# Patient Record
Sex: FEMALE | Race: WHITE | Employment: OTHER | URBAN - METROPOLITAN AREA
[De-identification: names, ages, dates, MRNs, and addresses within clinical notes are randomized per-mention and may not be internally consistent; named-entity substitution may affect disease eponyms.]

---

## 2017-01-06 ENCOUNTER — OFFICE VISIT (OUTPATIENT)
Dept: FAMILY MEDICINE | Facility: CLINIC | Age: 82
End: 2017-01-06
Payer: COMMERCIAL

## 2017-01-06 VITALS
TEMPERATURE: 97.1 F | WEIGHT: 131 LBS | BODY MASS INDEX: 24.73 KG/M2 | SYSTOLIC BLOOD PRESSURE: 154 MMHG | DIASTOLIC BLOOD PRESSURE: 62 MMHG | HEIGHT: 61 IN | OXYGEN SATURATION: 98 % | HEART RATE: 82 BPM

## 2017-01-06 DIAGNOSIS — I10 ESSENTIAL HYPERTENSION WITH GOAL BLOOD PRESSURE LESS THAN 140/90: Primary | ICD-10-CM

## 2017-01-06 DIAGNOSIS — F32.0 MAJOR DEPRESSIVE DISORDER, SINGLE EPISODE, MILD (H): ICD-10-CM

## 2017-01-06 LAB
ANION GAP SERPL CALCULATED.3IONS-SCNC: 10 MMOL/L (ref 3–14)
BUN SERPL-MCNC: 17 MG/DL (ref 7–30)
CALCIUM SERPL-MCNC: 9.3 MG/DL (ref 8.5–10.1)
CHLORIDE SERPL-SCNC: 104 MMOL/L (ref 94–109)
CO2 SERPL-SCNC: 27 MMOL/L (ref 20–32)
CREAT SERPL-MCNC: 0.71 MG/DL (ref 0.52–1.04)
CREAT UR-MCNC: 33 MG/DL
GFR SERPL CREATININE-BSD FRML MDRD: 78 ML/MIN/1.7M2
GLUCOSE SERPL-MCNC: 136 MG/DL (ref 70–99)
MICROALBUMIN UR-MCNC: 6 MG/L
MICROALBUMIN/CREAT UR: 19.3 MG/G CR (ref 0–25)
POTASSIUM SERPL-SCNC: 3.6 MMOL/L (ref 3.4–5.3)
SODIUM SERPL-SCNC: 141 MMOL/L (ref 133–144)

## 2017-01-06 PROCEDURE — 99214 OFFICE O/P EST MOD 30 MIN: CPT | Performed by: FAMILY MEDICINE

## 2017-01-06 PROCEDURE — 36415 COLL VENOUS BLD VENIPUNCTURE: CPT | Performed by: FAMILY MEDICINE

## 2017-01-06 PROCEDURE — 82043 UR ALBUMIN QUANTITATIVE: CPT | Performed by: FAMILY MEDICINE

## 2017-01-06 PROCEDURE — 80048 BASIC METABOLIC PNL TOTAL CA: CPT | Performed by: FAMILY MEDICINE

## 2017-01-06 RX ORDER — DILTIAZEM HYDROCHLORIDE 240 MG/1
240 CAPSULE, COATED, EXTENDED RELEASE ORAL EVERY EVENING
Qty: 90 CAPSULE | Refills: 1 | Status: SHIPPED | OUTPATIENT
Start: 2017-01-06 | End: 2017-07-14

## 2017-01-06 RX ORDER — CANDESARTAN 32 MG/1
1 TABLET ORAL DAILY
Qty: 90 TABLET | Refills: 1 | Status: SHIPPED | OUTPATIENT
Start: 2017-01-06 | End: 2017-07-14

## 2017-01-06 RX ORDER — VALSARTAN 320 MG/1
320 TABLET ORAL DAILY
Qty: 90 TABLET | Refills: 1 | Status: CANCELLED | OUTPATIENT
Start: 2017-01-06

## 2017-01-06 ASSESSMENT — PAIN SCALES - GENERAL: PAINLEVEL: NO PAIN (0)

## 2017-01-06 NOTE — MR AVS SNAPSHOT
After Visit Summary   1/6/2017    Leela uDdley    MRN: [id number]           Patient Information     Date Of Birth          9/14/1926        Visit Information        Provider Department      1/6/2017 10:40 AM Ronnie Arora MD [facility]        Today's Diagnoses     Essential hypertension with goal blood pressure less than 140/90    -  1     Major depressive disorder, single episode, mild (H)         Need for prophylactic vaccination and inoculation against influenza            Follow-ups after your visit        Follow-up notes from your care team     Return in about 6 months (around 6/26/2017) for blood pressure check.      Who to contact     If you have questions or need follow up information about today's clinic visit or your schedule please contact [facility] directly at [phone].  Normal or non-critical lab and imaging results will be communicated to you by MyChart, letter or phone within 4 business days after the clinic has received the results. If you do not hear from us within 7 days, please contact the clinic through MyChart or phone. If you have a critical or abnormal lab result, we will notify you by phone as soon as possible.  Submit refill requests through [employer] or call your pharmacy and they will forward the refill request to us. Please allow 3 business days for your refill to be completed.          Additional Information About Your Visit        MyChart Information     [employer] gives you secure access to your electronic health record. If you see a primary care provider, you can also send messages to your care team and make appointments. If you have questions, please call your primary care clinic.  If you do not have a primary care provider, please call [phone] and they will assist you.        Care EveryWhere ID     This is your Care EveryWhere ID. This could be used by other organizations to access your [facility]  "records  [id number]        Your Vitals Were     Pulse Temperature Height BMI (Body Mass Index) Pulse Oximetry Breastfeeding?    82 97.1  F (36.2  C) (Oral) 5' 1\" (1.549 m) 24.76 kg/m2 98% No       Blood Pressure from Last 3 Encounters:   01/06/17 154/62   05/20/16 132/66   12/11/15 136/76    Weight from Last 3 Encounters:   01/06/17 131 lb (59.421 kg)   07/29/16 125 lb (56.7 kg)   05/20/16 125 lb (56.7 kg)              We Performed the Following     Albumin Random Urine Quantitative     BASIC METABOLIC PANEL     DEPRESSION ACTION PLAN (DAP) Order [[id number]]          Today's Medication Changes          These changes are accurate as of: 1/6/17 11:32 AM.  If you have any questions, ask your nurse or doctor.               Start taking these medicines.        Dose/Directions    candesartan cilexetil 32 MG Tabs   Commonly known as:  ATACAND   Used for:  Essential hypertension with goal blood pressure less than 140/90   Started by:  Ronnie Arora MD        Dose:  1 tablet   Take 1 tablet by mouth daily for blood pressure.   Quantity:  90 tablet   Refills:  1         Stop taking these medicines if you haven't already. Please contact your care team if you have questions.     valsartan 320 MG tablet   Commonly known as:  DIOVAN   Stopped by:  Ronnie Arora MD                Where to get your medicines      These medications were sent to [facility]/pharmacy #[id number] - [facility] [place] - [address].  [address], [place] [address]     Phone:  [phone]    - candesartan cilexetil 32 MG Tabs  - diltiazem 240 MG 24 hr capsule             Primary Care Provider Office Phone # Fax #    Ronnie Arora [phone]       [facility] [address]  [facility] [address]        Thank you!     Thank you for choosing [facility]  for your care. Our goal is always to provide you with excellent care. Hearing back from our patients is one way we can continue " "to improve our services. Please take a few minutes to complete the written survey that you may receive in the mail after your visit with us. Thank you!             Your Updated Medication List - Protect others around you: Learn how to safely use, store and throw away your medicines at www.disposemymeds.org.          This list is accurate as of: 1/6/17 11:32 AM.  Always use your most recent med list.                   Brand Name Dispense Instructions for use    aspirin 81 MG tablet      1 TABLET DAILY       CALTRATE 600+D PO      Take 1 tablet by mouth daily.       candesartan cilexetil 32 MG Tabs    ATACAND    90 tablet    Take 1 tablet by mouth daily for blood pressure.       diltiazem 240 MG 24 hr capsule     90 capsule    Take 1 capsule (240 mg) by mouth every evening for blood pressure.       order for DME     1 Device    Equipment being ordered: Wheelchair  Vital signs:  Height 5' 1.5\" (1.562 m), weight 138 lb (62.596 kg)         "

## 2017-01-06 NOTE — Clinical Note
My Depression Action Plan  Name: Leela Dudley   Date of Birth 9/14/1926  Date: 1/6/2017    My doctor: Ronnie Arora   My clinic: [address] [id number] [phone]          GREEN    ZONE   Good Control    What it looks like:     Things are going generally well. You have normal up s and down s. You may even feel depressed from time to time, but bad moods usually last less than a day.   What you need to do:  1. Continue to care for yourself (see self care plan)  2. Check your depression survival kit and update it as needed  3. Follow your physician s recommendations including any medication.  4. Do not stop taking medication unless you consult with your physician first.           YELLOW         ZONE Getting Worse    What it looks like:     Depression is starting to interfere with your life.     It may be hard to get out of bed; you may be starting to isolate yourself from others.    Symptoms of depression are starting to last most all day and this has happened for several days.     You may have suicidal thoughts but they are not constant.   What you need to do:     1. Call your care team, your response to treatment will improve if you keep your care team informed of your progress. Yellow periods are signs an adjustment may need to be made.     2. Continue your self-care, even if you have to fake it!    3. Talk to someone in your support network    4. Open up your depression survival kit           RED    ZONE Medical Alert - Get Help    What it looks like:     Depression is seriously interfering with your life.     You may experience these or other symptoms: You can t get out of bed most days, can t work or engage in other necessary activities, you have trouble taking care of basic hygiene, or basic responsibilities, thoughts of suicide or death that will not go away, self-injurious behavior.     What you need to do:  1. Call your  care team and request a same-day appointment. If they are not available (weekends or after hours) call your local crisis line, emergency room or 911.      Electronically signed by: Annalee Delcid, January 6, 2017    Depression Self Care Plan / Survival Kit    Self-Care for Depression  Here s the deal. Your body and mind are really not as separate as most people think.  What you do and think affects how you feel and how you feel influences what you do and think. This means if you do things that people who feel good do, it will help you feel better.  Sometimes this is all it takes.  There is also a place for medication and therapy depending on how severe your depression is, so be sure to consult with your medical provider and/ or Behavioral Health Consultant if your symptoms are worsening or not improving.     In order to better manage my stress, I will:    Exercise  Get some form of exercise, every day. This will help reduce pain and release endorphins, the  feel good  chemicals in your brain. This is almost as good as taking antidepressants!  This is not the same as joining a gym and then never going! (they count on that by the way ) It can be as simple as just going for a walk or doing some gardening, anything that will get you moving.      Hygiene   Maintain good hygiene (Get out of bed in the morning, Make your bed, Brush your teeth, Take a shower, and Get dressed like you were going to work, even if you are unemployed).  If your clothes don't fit try to get ones that do.    Diet  I will strive to eat foods that are good for me, drink plenty of water, and avoid excessive sugar, caffeine, alcohol, and other mood-altering substances.  Some foods that are helpful in depression are: complex carbohydrates, B vitamins, flaxseed, fish or fish oil, fresh fruits and vegetables.    Psychotherapy  I agree to participate in Individual Therapy (if recommended).    Medication  If prescribed medications, I agree to take them.   Missing doses can result in serious side effects.  I understand that drinking alcohol, or other illicit drug use, may cause potential side effects.  I will not stop my medication abruptly without first discussing it with my provider.    Staying Connected With Others  I will stay in touch with my friends, family members, and my primary care provider/team.    Use your imagination  Be creative.  We all have a creative side; it doesn t matter if it s oil painting, sand castles, or mud pies! This will also kick up the endorphins.    Witness Beauty  (AKA stop and smell the roses) Take a look outside, even in mid-winter. Notice colors, textures. Watch the squirrels and birds.     Service to others  Be of service to others.  There is always someone else in need.  By helping others we can  get out of ourselves  and remember the really important things.  This also provides opportunities for practicing all the other parts of the program.    Humor  Laugh and be silly!  Adjust your TV habits for less news and crime-drama and more comedy.    Control your stress  Try breathing deep, massage therapy, biofeedback, and meditation. Find time to relax each day.     My support system    Clinic Contact:  Phone number:    Contact 1:  Phone number:    Contact 2:  Phone number:    Oriental orthodox/:  Phone number:    Therapist:  Phone number:    Local crisis center:    Phone number:    Other community support:  Phone number:

## 2017-01-06 NOTE — NURSING NOTE
"Chief Complaint   Patient presents with     Recheck Medication       Initial /81 mmHg  Pulse 82  Temp(Src) 97.1  F (36.2  C) (Oral)  Ht 5' 1\" (1.549 m)  Wt 131 lb (59.421 kg)  BMI 24.76 kg/m2  SpO2 98%  Breastfeeding? No Estimated body mass index is 24.76 kg/(m^2) as calculated from the following:    Height as of this encounter: 5' 1\" (1.549 m).    Weight as of this encounter: 131 lb (59.421 kg).  BP completed using cuff size: girma Leggett MA          "

## 2017-01-06 NOTE — PROGRESS NOTES
SUBJECTIVE:                                                    Leela Dudley is a 89 year old female who presents to clinic today for the following health issues:  She is accompanied by her daughter.     Hypertension Follow-up      Outpatient blood pressures are not being checked.    Low Salt Diet: low salt   -Patient's daughter reports that the valsartan is so big that she has difficulty swallowing them at times.    Depression Followup    Status since last visit: Stable     See PHQ-9 for current symptoms.  Other associated symptoms: None    Complicating factors:   Significant life event:  No   Current substance abuse:  None  Anxiety or Panic symptoms:  No    PHQ-9  English PHQ-9   Any Language      -Patient reports a nurse from [facility] was at her mother's house and told them to make an appointment with the doctor. The in-home nurse comes once a week in the late morning to check on the patient, do house chores, cleaning.       Amount of exercise or physical activity: None    Problems taking medications regularly: No    Medication side effects: none    Diet: low salt    Past medical, family, and social histories, medications, and allergies are reviewed and updated in Epic.     ROS:  C: NEGATIVE for fever, chills, change in weight  E/M: NEGATIVE for ear, mouth and throat problems  R: NEGATIVE for significant cough or SOB  CV: NEGATIVE for chest pain, palpitations or peripheral edema  PSYCHIATRIC: Hx dementia per daughter. Patient's daughter reports being told by the in-home nurse that there are meds left over from Feb 2016. However, patient reports she takes all her meds consistently. Patient's daughter notes the patient has a system she uses to keep track of which pills she has taken (once she takes a pill out of the bottle she flips the bottle over). Patient's daughter reports she has tried to give her a medication week box but she does not use it.   ROS otherwise negative      Any history above obtained by the  "Medical Assistant was reviewed by Dr. Ronnie Arora MD, and edited when necessary.    This document serves as a record of the services and decisions personally performed and made by Dr. Arora. It was created on his behalf by Annalee Delcid, a trained medical scribe. The creation of this document is based on the provider's statements to the medical scribe.  Annalee Delcid January 6, 2017 11:01 AM   OBJECTIVE:                                                    /62 mmHg  Pulse 82  Temp(Src) 97.1  F (36.2  C) (Oral)  Ht 5' 1\" (1.549 m)  Wt 131 lb (59.421 kg)  BMI 24.76 kg/m2  SpO2 98%  Breastfeeding? No  Body mass index is 24.76 kg/(m^2).  GENERAL: healthy, alert and no distress  EYES: Eyes grossly normal to inspection, PERRL and conjunctivae and sclerae normal  MS: no gross musculoskeletal defects noted, no edema  SKIN: no suspicious lesions or rashes  NEURO: Normal strength and tone, mentation intact and speech normal, cranial nerves 2-12 intact   PSYCH: mentation appears normal, affect normal/bright     Six Item Cognitive Impairment Test   (6CIT):      What year is it?                               Incorrect - 4 points (2016)    What month is it?                               Correct - 0 points      Give the patient an address to remember with five components:   Jm Bradley ( first and last name - 2 components)   [address]  (number and name of street - 2 components)   [place] ( city - 1 component)      About what time is it (within the hour)? Correct - 0 points    Count backwards from 20 to 1:   Correct - 0 points    Say the months of the year in reverse: Correct - 0 points    Repeat the address phrase:   4 errors - 8 points    Total 6CIT Score:      12/28    Interpretation: The 6CIT uses an inverse score and questions are weighted to produce a total out of 28. Scores of 0-7 are considered normal and 8 or more significant.    Advantages The test has high sensitivity without compromising " specificity even in mild dementia. It is easy to translate linguistically and culturally.  Disadvantages The main disadvantage is in the scoring and weighting of the test, which is initially confusing, however computer models have simplified this greatly.    Probability Statistics: At the 7/8 cut off: Overall figures sensitivity 90% specificity 100%, in mild dementia sensitivity = 78% , specificity = 100%    Copyright 2000 [facility], [facility]. Courtesy of Dr. Jonny Merchant      ASSESSMENT/PLAN:                                                    (I10) Essential hypertension with goal blood pressure less than 140/90  (primary encounter diagnosis)  Comment: Uncontrolled. Factors include advanced age (consider changing her goal), noncompliance due to cognitive impairment, and apparent difficulty with swallowing the large valsartan pill. I will change the valsartan to another ARB.  Plan: BASIC METABOLIC PANEL, Albumin Random Urine         Quantitative, diltiazem 240 MG 24 hr capsule,         candesartan cilexetil (ATACAND) 32 MG TABS        Follow up in 6 months. Daughter will do the best she can to help with med compliance in the home.     (F32.0) Major depressive disorder, single episode, mild (H)  Comment: The patient shows to discontinue her SSRI about 6 months ago, and there is no desire to take medication for this now.   Plan: DEPRESSION ACTION PLAN (DAP) Order [[id number]]        Follow up in 6 months      The information in this document, created by the medical scribe for me, accurately reflects the services I personally performed and the decisions made by me. I have reviewed and approved this document for accuracy prior to leaving the patient care area.  Ronnie Arora MD

## 2017-01-07 ASSESSMENT — PATIENT HEALTH QUESTIONNAIRE - PHQ9: SUM OF ALL RESPONSES TO PHQ QUESTIONS 1-9: 9

## 2017-05-05 ENCOUNTER — OFFICE VISIT (OUTPATIENT)
Dept: OPHTHALMOLOGY | Facility: CLINIC | Age: 82
End: 2017-05-05
Payer: COMMERCIAL

## 2017-05-05 DIAGNOSIS — Z01.01 ENCOUNTER FOR EXAMINATION OF EYES AND VISION WITH ABNORMAL FINDINGS: Primary | ICD-10-CM

## 2017-05-05 DIAGNOSIS — H52.4 PRESBYOPIA: ICD-10-CM

## 2017-05-05 DIAGNOSIS — D31.31 CHOROIDAL NEVUS, RIGHT: ICD-10-CM

## 2017-05-05 DIAGNOSIS — H35.3190 NONEXUDATIVE SENILE MACULAR DEGENERATION OF RETINA: ICD-10-CM

## 2017-05-05 DIAGNOSIS — Z96.1 PSEUDOPHAKIA: ICD-10-CM

## 2017-05-05 PROCEDURE — 92014 COMPRE OPH EXAM EST PT 1/>: CPT | Performed by: STUDENT IN AN ORGANIZED HEALTH CARE EDUCATION/TRAINING PROGRAM

## 2017-05-05 PROCEDURE — 92015 DETERMINE REFRACTIVE STATE: CPT | Performed by: STUDENT IN AN ORGANIZED HEALTH CARE EDUCATION/TRAINING PROGRAM

## 2017-05-05 ASSESSMENT — REFRACTION_WEARINGRX
OD_CYLINDER: +1.00
OD_AXIS: 043
OD_SPHERE: -1.00
OS_ADD: +3.00
OS_CYLINDER: +1.25
SPECS_TYPE: BIFOCAL
OS_SPHERE: -2.00
OS_AXIS: 101
OD_ADD: +3.00

## 2017-05-05 ASSESSMENT — SLIT LAMP EXAM - LIDS
COMMENTS: NORMAL
COMMENTS: NORMAL

## 2017-05-05 ASSESSMENT — EXTERNAL EXAM - LEFT EYE: OS_EXAM: NORMAL

## 2017-05-05 ASSESSMENT — VISUAL ACUITY
OD_CC: 20/50
OD_PH_CC: 20/30-1
OS_CC+: -2
OS_CC: 20/25
METHOD: SNELLEN - LINEAR
CORRECTION_TYPE: GLASSES

## 2017-05-05 ASSESSMENT — REFRACTION_MANIFEST
OD_AXIS: 020
OD_ADD: +3.25
OS_ADD: +3.25
OD_CYLINDER: +1.75
OD_SPHERE: -1.00
OS_AXIS: 104
OS_CYLINDER: +1.25
OS_SPHERE: -2.00

## 2017-05-05 ASSESSMENT — TONOMETRY
OS_IOP_MMHG: 16
OD_IOP_MMHG: 14
IOP_METHOD: APPLANATION

## 2017-05-05 ASSESSMENT — CONF VISUAL FIELD
OD_NORMAL: 1
OS_NORMAL: 1

## 2017-05-05 ASSESSMENT — EXTERNAL EXAM - RIGHT EYE: OD_EXAM: NORMAL

## 2017-05-05 ASSESSMENT — CUP TO DISC RATIO
OD_RATIO: 0.2
OS_RATIO: 0.2

## 2017-05-05 NOTE — PROGRESS NOTES
Current Eye Medications:  None     Subjective:  Here for complete today. Doing well, no pain or problems with the eyes, last visit was 2014. Does have some white discharge in the corner of the right eye today on exam.       Objective:  See Ophthalmology Exam.       Assessment:  Leela Dudley is a 89 year old female who presents with:   Encounter Diagnoses   Name Primary?      Pseudophakia OU, with Yag OS      Choroidal nevus, right Stable     Nonexudative senile macular degeneration of retina Stable       Plan:  Glasses prescription given  Use a multiple vitamin as discussed on a daily basis or AREDS2 formula vitamins  Monitor the vision in each eye weekly - Call if any sudden persistent changes.  Wear sunglasses with UV protection in maura conditions.  Eat lots of dark leafy green vegetables.    Hi Grande MD  [phone]

## 2017-05-05 NOTE — PATIENT INSTRUCTIONS
Glasses prescription given  Use a multiple vitamin as discussed on a daily basis or AREDS2 formula vitamins  Monitor the vision in each eye weekly - Call if any sudden persistent changes.  Wear sunglasses with UV protection in maura conditions.  Eat lots of dark leafy green vegetables.    Hi Grande MD  [phone]

## 2017-05-15 ENCOUNTER — TELEPHONE (OUTPATIENT)
Dept: FAMILY MEDICINE | Facility: CLINIC | Age: 82
End: 2017-05-15

## 2017-05-15 NOTE — TELEPHONE ENCOUNTER
----- Message from Ronnie Arora MD sent at 1/6/2017 11:34 AM CST -----  Regarding: HTN  Daughter would like scheduling reminder for pt (due HTN visit last week of June)sent by Outline App.

## 2017-07-14 ENCOUNTER — OFFICE VISIT (OUTPATIENT)
Dept: FAMILY MEDICINE | Facility: CLINIC | Age: 82
End: 2017-07-14
Payer: COMMERCIAL

## 2017-07-14 VITALS
SYSTOLIC BLOOD PRESSURE: 178 MMHG | OXYGEN SATURATION: 97 % | HEART RATE: 96 BPM | HEIGHT: 61 IN | TEMPERATURE: 99 F | WEIGHT: 132 LBS | DIASTOLIC BLOOD PRESSURE: 88 MMHG | BODY MASS INDEX: 24.92 KG/M2

## 2017-07-14 DIAGNOSIS — Z86.39 HISTORY OF ELEVATED GLUCOSE: ICD-10-CM

## 2017-07-14 DIAGNOSIS — F32.0 MAJOR DEPRESSIVE DISORDER, SINGLE EPISODE, MILD (H): Primary | ICD-10-CM

## 2017-07-14 DIAGNOSIS — E78.5 HYPERLIPIDEMIA LDL GOAL <130: ICD-10-CM

## 2017-07-14 DIAGNOSIS — I10 ESSENTIAL HYPERTENSION WITH GOAL BLOOD PRESSURE LESS THAN 140/90: ICD-10-CM

## 2017-07-14 LAB
CHOLEST SERPL-MCNC: 237 MG/DL
CREAT SERPL-MCNC: 0.68 MG/DL (ref 0.52–1.04)
GFR SERPL CREATININE-BSD FRML MDRD: 81 ML/MIN/1.7M2
GLUCOSE SERPL-MCNC: 107 MG/DL (ref 70–99)
HBA1C MFR BLD: 6.5 % (ref 4.3–6)
HDLC SERPL-MCNC: 51 MG/DL
LDLC SERPL CALC-MCNC: 158 MG/DL
NONHDLC SERPL-MCNC: 186 MG/DL
POTASSIUM SERPL-SCNC: 4 MMOL/L (ref 3.4–5.3)
TRIGL SERPL-MCNC: 139 MG/DL

## 2017-07-14 PROCEDURE — 80061 LIPID PANEL: CPT | Performed by: FAMILY MEDICINE

## 2017-07-14 PROCEDURE — 82947 ASSAY GLUCOSE BLOOD QUANT: CPT | Performed by: FAMILY MEDICINE

## 2017-07-14 PROCEDURE — 84132 ASSAY OF SERUM POTASSIUM: CPT | Performed by: FAMILY MEDICINE

## 2017-07-14 PROCEDURE — 99214 OFFICE O/P EST MOD 30 MIN: CPT | Performed by: FAMILY MEDICINE

## 2017-07-14 PROCEDURE — 83036 HEMOGLOBIN GLYCOSYLATED A1C: CPT | Performed by: FAMILY MEDICINE

## 2017-07-14 PROCEDURE — 82565 ASSAY OF CREATININE: CPT | Performed by: FAMILY MEDICINE

## 2017-07-14 PROCEDURE — 36415 COLL VENOUS BLD VENIPUNCTURE: CPT | Performed by: FAMILY MEDICINE

## 2017-07-14 RX ORDER — DILTIAZEM HYDROCHLORIDE 360 MG/1
360 CAPSULE, EXTENDED RELEASE ORAL EVERY EVENING
Qty: 90 CAPSULE | Refills: 0 | Status: SHIPPED | OUTPATIENT
Start: 2017-07-14 | End: 2018-01-09

## 2017-07-14 RX ORDER — CANDESARTAN 32 MG/1
1 TABLET ORAL DAILY
Qty: 90 TABLET | Refills: 0 | Status: SHIPPED | OUTPATIENT
Start: 2017-07-14 | End: 2017-11-05

## 2017-07-14 ASSESSMENT — PAIN SCALES - GENERAL: PAINLEVEL: NO PAIN (0)

## 2017-07-14 NOTE — NURSING NOTE
"Chief Complaint   Patient presents with     Hypertension       Initial /89 (BP Location: Left arm, Patient Position: Chair, Cuff Size: Adult Regular)  Pulse 96  Temp 99  F (37.2  C) (Oral)  Ht 5' 1\" (1.549 m)  Wt 132 lb (59.9 kg)  SpO2 97%  BMI 24.94 kg/m2 Estimated body mass index is 24.94 kg/(m^2) as calculated from the following:    Height as of this encounter: 5' 1\" (1.549 m).    Weight as of this encounter: 132 lb (59.9 kg).  Medication Reconciliation: complete     EN Mayes MA      "

## 2017-07-14 NOTE — PROGRESS NOTES
"  SUBJECTIVE:                                                    Leela Dudley is a 89 year old female who presents to clinic today for the following health issues:  She is accompanied by her daughter.     Hypertension Follow-up      Outpatient blood pressures are not being checked.    Low Salt Diet: not monitoring salt      Amount of exercise or physical activity: None    Problems taking medications regularly: No    Medication side effects: none    Diet: regular (no restrictions)    Patient denies hx of CAD, DM, MI.    Past medical, family, and social histories, medications, and allergies are reviewed and updated in Epic.     ROS:  C: NEGATIVE for fever, chills, change in weight  E/M: NEGATIVE for ear, mouth and throat problems  R: NEGATIVE for significant cough or SOB  CV: NEGATIVE for chest pain, palpitations or peripheral edema  PSYCH: PHQ-9 = 1  ROS otherwise negative    Any history above obtained by the Medical Assistant was reviewed by Dr. Ronnie Arora MD, and edited when necessary.    This document serves as a record of the services and decisions personally performed and made by Dr. Arora. It was created on his behalf by Melvi Jordan, a trained medical scribe. The creation of this document is based the provider's statements to the medical scribe.  Melvi Jordan July 14, 2017 12:15 PM     OBJECTIVE:                                                    /88  Pulse 96  Temp 99  F (37.2  C) (Oral)  Ht 1.549 m (5' 1\")  Wt 59.9 kg (132 lb)  SpO2 97%  BMI 24.94 kg/m2   Body mass index is 24.94 kg/(m^2).     EXAM:  GENERAL: healthy, alert and no distress  EYES: Eyes grossly normal to inspection, PERRL, EOMI, sclerae white and conjunctivae normal  MS: no gross musculoskeletal defects noted, no edema  SKIN: no suspicious lesions or rashes  NEURO: Normal strength and tone, sensory exam grossly normal, mentation intact, oriented times 3 and cranial nerves 2-12 intact  PSYCH: mentation appears " normal, affect normal/bright     Diagnostic Test Results:  none      ASSESSMENT/PLAN:                                                      (F32.0) Major depressive disorder, single episode, mild (H)  (primary encounter diagnosis)  Comment: Stable  Plan: Monitor periodically    (I10) Essential hypertension with goal blood pressure less than 140/90  Comment: Uncontrolled on max dose of ARB. I will increase her CCB.  Plan: diltiazem (CARDIZEM CD; CARTIA XT) 360 MG 24 hr        CD capsule, candesartan cilexetil (ATACAND) 32         MG TABS, Potassium, Creatinine        Follow up in 3 weeks.     (E78.5) Hyperlipidemia LDL goal <130  Comment: Patient denies a history of CAD, heart or vascular surgery, etc.  Plan: Lipid panel reflex to direct LDL            (Z86.39) History of elevated glucose  Comment: Patient denies a history of diabetes, and she is not treated for it currently. She has had mild glucose elevations lately however.  Plan: Glucose, Hemoglobin A1c        Follow up as needed.     The information in this document, created by the medical scribe for me, accurately reflects the services I personally performed and the decisions made by me. I have reviewed and approved this document for accuracy prior to leaving the patient care area. July 14, 2017 12:15 PM     Ronnie Arora MD

## 2017-07-14 NOTE — PATIENT INSTRUCTIONS
How to contact your care team: [phone] Pharmacy [phone]   TEA BLANDON MD KATYA GEORGIEV, PA-C CHRIS JONES, PA-C NAM HO, MD JONATHAN BATES, MD ARVIN VOCAL, MD    Clinic hours M-Th 7am-7pm Fri 7am-5pm.   Urgent care M-F 11am-9pm  Sat/Sun 9am-5pm.   Pharmacy   Mon-Th:  8:00am-8pm   Fri:  8:00am-6:00pm  Sat/Sun  8:00am-5:00 pm

## 2017-07-14 NOTE — MR AVS SNAPSHOT
After Visit Summary   7/14/2017    Leela Dudley    MRN: [id number]           Patient Information     Date Of Birth          9/14/1926        Visit Information        Provider Department      7/14/2017 11:40 AM Ronnie Arora MD [facility]        Today's Diagnoses     Major depressive disorder, single episode, mild (H)    -  1    Essential hypertension with goal blood pressure less than 140/90        Hyperlipidemia LDL goal <130        History of elevated glucose          Care Instructions    How to contact your care team: [phone] Pharmacy [phone]   TEA BLANDON MD KATYA GEORGIEV, PA-C CHRIS JONES, PA-C NAM HO, MD JONATHAN BATES, MD ARVIN VOCAL, MD    Clinic hours M-Th 7am-7pm Fri 7am-5pm.   Urgent care M-F 11am-9pm  Sat/Sun 9am-5pm.   Pharmacy   Mon-Th:  8:00am-8pm   Fri:  8:00am-6:00pm  Sat/Sun  8:00am-5:00 pm               Follow-ups after your visit        Follow-up notes from your care team     Return in about 3 weeks (around 8/4/2017) for blood pressure check.      Who to contact     If you have questions or need follow up information about today's clinic visit or your schedule please contact [facility] directly at [phone].  Normal or non-critical lab and imaging results will be communicated to you by MyChart, letter or phone within 4 business days after the clinic has received the results. If you do not hear from us within 7 days, please contact the clinic through [employer] or phone. If you have a critical or abnormal lab result, we will notify you by phone as soon as possible.  Submit refill requests through [employer] It [employer] or call your pharmacy and they will forward the refill request to us. Please allow 3 business days for your refill to be completed.          Additional Information About Your Visit        MyChart Information     [employer] Check gives you secure access to your electronic health record. If you see a  "primary care provider, you can also send messages to your care team and make appointments. If you have questions, please call your primary care clinic.  If you do not have a primary care provider, please call [phone] and they will assist you.        Care EveryWhere ID     This is your Care EveryWhere ID. This could be used by other organizations to access your [place] medical records  [id number]        Your Vitals Were     Pulse Temperature Height Pulse Oximetry BMI (Body Mass Index)       96 99  F (37.2  C) (Oral) 5' 1\" (1.549 m) 97% 24.94 kg/m2        Blood Pressure from Last 3 Encounters:   07/14/17 178/88   01/06/17 154/62   05/20/16 132/66    Weight from Last 3 Encounters:   07/14/17 132 lb (59.9 kg)   01/06/17 131 lb (59.4 kg)   07/29/16 125 lb (56.7 kg)              We Performed the Following     Creatinine     Glucose     Hemoglobin A1c     Lipid panel reflex to direct LDL     Potassium          Today's Medication Changes          These changes are accurate as of: 7/14/17 12:26 PM.  If you have any questions, ask your nurse or doctor.               These medicines have changed or have updated prescriptions.        Dose/Directions    diltiazem 360 MG 24 hr CD capsule   Commonly known as:  CARDIZEM CD; CARTIA XT   This may have changed:    - medication strength  - how much to take   Used for:  Essential hypertension with goal blood pressure less than 140/90   Changed by:  Ronnie Arora MD        Dose:  360 mg   Take 1 capsule (360 mg) by mouth every evening for blood pressure.   Quantity:  90 capsule   Refills:  0            Where to get your medicines      These medications were sent to [facility]/pharmacy #[id number] - [place], [place] - [address].  [address], [place] [facility] [address]     Phone:  [phone]     candesartan cilexetil 32 MG Tabs    diltiazem 360 MG 24 hr CD capsule                Primary Care Provider Office Phone # Fax #    Ronnie Arora [phone] " [phone]       [facility] [address]  [place] [address]        Equal Access to Services     JULISA PATEL : Hadii aad ku hadlio Sodioniali, waaxda luqadaha, qaybta kaalmada adeegyada, andreina wilann marie tee laderrickalejandrina liss. So [facility] [phone].    ATENCIÓN: Si habla español, tiene a arzola disposición servicios gratuitos de asistencia lingüística. Llame al [phone].    We comply with applicable federal civil rights laws and [place] laws. We do not discriminate on the basis of race, color, national origin, age, disability sex, sexual orientation or gender identity.            Thank you!     Thank you for choosing [facility]  for your care. Our goal is always to provide you with excellent care. Hearing back from our patients is one way we can continue to improve our services. Please take a few minutes to complete the written survey that you may receive in the mail after your visit with us. Thank you!             Your Updated Medication List - Protect others around you: Learn how to safely use, store and throw away your medicines at www.disposemymeds.org.          This list is accurate as of: 7/14/17 12:26 PM.  Always use your most recent med list.                   Brand Name Dispense Instructions for use Diagnosis    aspirin 81 MG tablet      1 TABLET DAILY        CALTRATE 600+D PO      Take 1 tablet by mouth daily.        candesartan cilexetil 32 MG Tabs    ATACAND    90 tablet    Take 1 tablet by mouth daily for blood pressure.    Essential hypertension with goal blood pressure less than 140/90       diltiazem 360 MG 24 hr CD capsule    CARDIZEM CD; CARTIA XT    90 capsule    Take 1 capsule (360 mg) by mouth every evening for blood pressure.    Essential hypertension with goal blood pressure less than 140/90

## 2017-07-15 ASSESSMENT — PATIENT HEALTH QUESTIONNAIRE - PHQ9: SUM OF ALL RESPONSES TO PHQ QUESTIONS 1-9: 1

## 2017-08-04 ENCOUNTER — OFFICE VISIT (OUTPATIENT)
Dept: FAMILY MEDICINE | Facility: CLINIC | Age: 82
End: 2017-08-04
Payer: COMMERCIAL

## 2017-08-04 VITALS
WEIGHT: 133 LBS | SYSTOLIC BLOOD PRESSURE: 162 MMHG | TEMPERATURE: 97.7 F | HEIGHT: 61 IN | OXYGEN SATURATION: 98 % | BODY MASS INDEX: 25.11 KG/M2 | DIASTOLIC BLOOD PRESSURE: 76 MMHG | HEART RATE: 83 BPM

## 2017-08-04 DIAGNOSIS — I10 ESSENTIAL HYPERTENSION WITH GOAL BLOOD PRESSURE LESS THAN 140/90: Primary | ICD-10-CM

## 2017-08-04 PROCEDURE — 99214 OFFICE O/P EST MOD 30 MIN: CPT | Performed by: FAMILY MEDICINE

## 2017-08-04 RX ORDER — DOXAZOSIN 2 MG/1
2 TABLET ORAL AT BEDTIME
Qty: 90 TABLET | Refills: 0 | Status: CANCELLED | OUTPATIENT
Start: 2017-08-04

## 2017-08-04 RX ORDER — HYDROCHLOROTHIAZIDE 25 MG/1
25 TABLET ORAL DAILY
Qty: 90 TABLET | Refills: 0 | Status: SHIPPED | OUTPATIENT
Start: 2017-08-04 | End: 2017-11-05

## 2017-08-04 ASSESSMENT — PAIN SCALES - GENERAL: PAINLEVEL: NO PAIN (0)

## 2017-08-04 NOTE — NURSING NOTE
"Chief Complaint   Patient presents with     Hypertension       Initial /82 (BP Location: Left arm, Patient Position: Chair, Cuff Size: Adult Regular)  Pulse 83  Temp 97.7  F (36.5  C) (Oral)  Ht 5' 1\" (1.549 m)  Wt 133 lb (60.3 kg)  SpO2 (!) 88%  BMI 25.13 kg/m2 Estimated body mass index is 25.13 kg/(m^2) as calculated from the following:    Height as of this encounter: 5' 1\" (1.549 m).    Weight as of this encounter: 133 lb (60.3 kg).  Medication Reconciliation: complete   GINI Carvajal MA      "

## 2017-08-04 NOTE — PATIENT INSTRUCTIONS
================================================================================  Normal Values   Blood pressure  <140/90 for most adults    <130/80 for some chronic diseases (ask your care team about yours)    BMI (body mass index)  18.5-25 kg/m2 (based on height and weight)     Thank you for visiting [facility]    Normal or non-critical lab and imaging results will be communicated to you by MyChart, letter or phone within 7 days.  If you do not hear from us within 10 days, please call the clinic. If you have a critical or abnormal lab result, we will notify you by phone as soon as possible.     If you have any questions regarding your visit please contact:     Team Comfort:   Clinic Hours Telephone Number   Dr. Ronnie Krueger 7am-5pm  Monday - Friday [phone]  Natali Lin RN   Pharmacy 8:00am-8pm Monday-Friday    9am-5pm Saturday-Sunday [phone]   Urgent Care 11am-9pm Monday-Friday        9am-5pm Saturday-Sunday [phone]     After hours, weekend or if you need to make an appointment with your primary provider please call [phone].   After Hours nurse advise: call [facility]: [phone]    Medication Refills:  Call your pharmacy and they will forward the refill to us. Please allow 3 business days for your refills to be completed.

## 2017-08-04 NOTE — MR AVS SNAPSHOT
After Visit Summary   8/4/2017    Leela Dudley    MRN: [id number]           Patient Information     Date Of Birth          9/14/1926        Visit Information        Provider Department      8/4/2017 11:40 AM Ronnie Arora MD [facility]        Today's Diagnoses     Essential hypertension with goal blood pressure less than 140/90    -  1      Care Instructions        ================================================================================  Normal Values   Blood pressure  <140/90 for most adults    <130/80 for some chronic diseases (ask your care team about yours)    BMI (body mass index)  18.5-25 kg/m2 (based on height and weight)     Thank you for visiting [facility]    Normal or non-critical lab and imaging results will be communicated to you by MyChart, letter or phone within 7 days.  If you do not hear from us within 10 days, please call the clinic. If you have a critical or abnormal lab result, we will notify you by phone as soon as possible.     If you have any questions regarding your visit please contact:     Team Comfort:   Clinic Hours Telephone Number   Dr. Ronnie Krueger 7am-5pm  Monday - Friday [phone]  Natali Lin RN   Pharmacy 8:00am-8pm Monday-Friday    9am-5pm Saturday-Sunday [phone]   Urgent Care 11am-9pm Monday-Friday        9am-5pm Saturday-Sunday [phone]     After hours, weekend or if you need to make an appointment with your primary provider please call [phone].   After Hours nurse advise: call [facility]: [phone]    Medication Refills:  Call your pharmacy and they will forward the refill to us. Please allow 3 business days for your refills to be completed.                  Follow-ups after your visit        Follow-up notes from your care team     Return in about 2 weeks (around 8/18/2017) for blood  "pressure check.      Who to contact     If you have questions or need follow up information about today's clinic visit or your schedule please contact [facility] directly at [phone].  Normal or non-critical lab and imaging results will be communicated to you by MyChart, letter or phone within 4 business days after the clinic has received the results. If you do not hear from us within 7 days, please contact the clinic through [employer] or phone. If you have a critical or abnormal lab result, we will notify you by phone as soon as possible.  Submit refill requests through [employer] or call your pharmacy and they will forward the refill request to us. Please allow 3 business days for your refill to be completed.          Additional Information About Your Visit        [facility] Custom Information     [employer] gives you secure access to your electronic health record. If you see a primary care provider, you can also send messages to your care team and make appointments. If you have questions, please call your primary care clinic.  If you do not have a primary care provider, please call [phone] and they will assist you.        Care EveryWhere ID     This is your Care EveryWhere ID. This could be used by other organizations to access your [place] medical records  [id number]        Your Vitals Were     Pulse Temperature Height Pulse Oximetry BMI (Body Mass Index)       83 97.7  F (36.5  C) (Oral) 1.549 m (5' 1\") 98% 25.13 kg/m2        Blood Pressure from Last 3 Encounters:   08/04/17 162/76   07/14/17 178/88   01/06/17 154/62    Weight from Last 3 Encounters:   08/04/17 60.3 kg (133 lb)   07/14/17 59.9 kg (132 lb)   01/06/17 59.4 kg (131 lb)              Today, you had the following     No orders found for display         Today's Medication Changes          These changes are accurate as of: 8/4/17 12:11 PM.  If you have any questions, ask your nurse or doctor.               Start taking these medicines.  "       Dose/Directions    hydrochlorothiazide 25 MG tablet   Commonly known as:  HYDRODIURIL   Used for:  Essential hypertension with goal blood pressure less than 140/90   Started by:  Ronnie Arora MD        Dose:  25 mg   Take 1 tablet (25 mg) by mouth daily for blood pressure.   Quantity:  90 tablet   Refills:  0            Where to get your medicines      These medications were sent to [facility]/pharmacy #[id number] - [place], [place] - [address].  [address] [place] [address]     Phone:  [phone]     hydrochlorothiazide 25 MG tablet                Primary Care Provider Office Phone # Fax #    Ronnie Arora [phone]       [facility] [address] [place]  [facility] [address]        Equal Access to Services     [facility]: Hadii aad ku hadasho Soomaali, waaxda luqadaha, qaybta kaalmada adeegyada, waxay idiin hayaabrian walker . So [facility] [phone].    ATENCIÓN: Si habla español, tiene a arzola disposición servicios gratuitos de asistencia lingüística. [facility] [phone].    We comply with applicable federal civil rights laws and [place] laws. We do not discriminate on the basis of race, color, national origin, age, disability sex, sexual orientation or gender identity.            Thank you!     Thank you for choosing [facility]  for your care. Our goal is always to provide you with excellent care. Hearing back from our patients is one way we can continue to improve our services. Please take a few minutes to complete the written survey that you may receive in the mail after your visit with us. Thank you!             Your Updated Medication List - Protect others around you: Learn how to safely use, store and throw away your medicines at www.disposemymeds.org.          This list is accurate as of: 8/4/17 12:11 PM.  Always use your most recent med list.                   Brand Name Dispense Instructions for use Diagnosis     aspirin 81 MG tablet      1 TABLET DAILY        CALTRATE 600+D PO      Take 1 tablet by mouth daily.        candesartan cilexetil 32 MG Tabs    ATACAND    90 tablet    Take 1 tablet by mouth daily for blood pressure.    Essential hypertension with goal blood pressure less than 140/90       diltiazem 360 MG 24 hr CD capsule    CARDIZEM CD; CARTIA XT    90 capsule    Take 1 capsule (360 mg) by mouth every evening for blood pressure.    Essential hypertension with goal blood pressure less than 140/90       hydrochlorothiazide 25 MG tablet    HYDRODIURIL    90 tablet    Take 1 tablet (25 mg) by mouth daily for blood pressure.    Essential hypertension with goal blood pressure less than 140/90

## 2017-08-04 NOTE — PROGRESS NOTES
"  SUBJECTIVE:                                                    Leela Dudley is a 89 year old female who presents to clinic today for the following health issues:  She is accompanied by her daughter.     Hypertension Follow-up      Outpatient blood pressures are being checked at home.  Results are high.    Low Salt Diet: no added salt      Amount of exercise or physical activity: None    Problems taking medications regularly: No    Medication side effects: none  Diet: regular (no restrictions)    Patient's daughter states that the patient reported dizziness since changing her medications. Patient does not feel dizzy all the time, however.    She has taken HCTZ in the past, but stopped because she didn't like the diuretic side effects.     Past medical, family, and social histories, medications, and allergies are reviewed and updated in Epic.     ROS:  C: NEGATIVE for fever, chills, change in weight  E/M: NEGATIVE for ear, mouth and throat problems  R: NEGATIVE for significant cough or SOB  CV: NEGATIVE for chest pain, palpitations or peripheral edema  ROS otherwise negative    Any history above obtained by the Medical Assistant was reviewed by Dr. Ronnie Arroa MD, and edited when necessary.    This document serves as a record of the services and decisions personally performed and made by Dr. Arora. It was created on his behalf by Melvi Jordan, a trained medical scribe. The creation of this document is based the provider's statements to the medical scribe.  Melvi Jordan August 4, 2017 11:55 AM     OBJECTIVE:                                                    /76  Pulse 83  Temp 97.7  F (36.5  C) (Oral)  Ht 1.549 m (5' 1\")  Wt 60.3 kg (133 lb)  SpO2 98%  BMI 25.13 kg/m2   Body mass index is 25.13 kg/(m^2).     GENERAL: healthy, alert and no distress  EYES: Eyes grossly normal to inspection, PERRL, EOMI, sclerae white and conjunctivae normal  RESP: lungs clear to auscultation - no " crackles or wheezes, no areas of dullness, no tachypnea  CV: Heart regular rate and rhythm without murmur, click or rub. No peripheral edema and peripheral pulses strong  MS: no gross musculoskeletal defects noted, no edema  SKIN: no suspicious lesions or rashes  NEURO: Normal strength and tone, sensory exam grossly normal, mentation intact, oriented times 3 and cranial nerves 2-12 intact  PSYCH: mentation appears normal, affect normal/bright     Diagnostic Test Results:  none      ASSESSMENT/PLAN:                                                      (I10) Essential hypertension with goal blood pressure less than 140/90  (primary encounter diagnosis)  Comment: Improved but still uncontrolled. I will add a third agent. I offered an alpha-blocker, but the patient is hesitant to risk the possible first dose hypotension. She has taken hydrochlorothiazide in the past and did not care for the increased urination but she is willing to try again.   Plan: hydrochlorothiazide (HYDRODIURIL) 25 MG tablet        Recheck in 2 weeks.      The information in this document, created by the medical scribe for me, accurately reflects the services I personally performed and the decisions made by me. I have reviewed and approved this document for accuracy prior to leaving the patient care area. August 4, 2017 11:55 AM     Ronnie Arora MD

## 2017-08-18 ENCOUNTER — OFFICE VISIT (OUTPATIENT)
Dept: FAMILY MEDICINE | Facility: CLINIC | Age: 82
End: 2017-08-18
Payer: COMMERCIAL

## 2017-08-18 VITALS
HEART RATE: 80 BPM | HEIGHT: 61 IN | DIASTOLIC BLOOD PRESSURE: 68 MMHG | SYSTOLIC BLOOD PRESSURE: 150 MMHG | TEMPERATURE: 98.3 F | OXYGEN SATURATION: 97 %

## 2017-08-18 DIAGNOSIS — I10 ESSENTIAL HYPERTENSION WITH GOAL BLOOD PRESSURE LESS THAN 140/90: Primary | ICD-10-CM

## 2017-08-18 PROCEDURE — 99213 OFFICE O/P EST LOW 20 MIN: CPT | Performed by: FAMILY MEDICINE

## 2017-08-18 NOTE — PATIENT INSTRUCTIONS
This summary includes the important diagnoses, test, medications and other important parts of your medical history.  Below are a few good we sites you can use to learn more about these.     Www.[employer].org : Up to date and easily searchable information on multiple topics.  Www.[employer].org/Pharmacy/c_539084.asp : [place] [facility] $4.99 medications  Www.medlineplus.gov : medication info, interactive tutorials, watch real surgeries online  Www.familydoctor.org : good info from the Academy of Family Physicians  Www.mayoProject 2020inic.com : good info from the [facility]  Www.cdc.gov : public health info, travel advisories, epidemics (H1N1)  Www.aap.org : children's health info, normal development, vaccinations  Www.health.[facility].[place].us : [place] dept of heat, public health issues in [place], N1N1    Based on your medical history and these are the current health maintenance or preventive care services that you are due for (some may have been done at this visit:)  Health Maintenance Due   Topic Date Due     ADVANCE DIRECTIVE PLANNING Q5 YRS  04/18/2017     =================================================================================  Normal Values   Blood pressure  <140/90 for most adults    <130/80 for some chronic diseases (ask your care team about yours)    BMI (body mass index)  18.5-25 kg/m2 (based on height and weight)     Thank you for visiting [facility]    Normal or non-critical lab and imaging results will be communicated to you by MyChart, letter or phone within 7 days.  If you do not hear from us within 10 days, please call the clinic. If you have a critical or abnormal lab result, we will notify you by phone as soon as possible.     If you have any questions regarding your visit please contact:     Team Comfort:   Clinic Hours Telephone Number   Dr. Ronnie Dale   7am-5pm  Monday - Friday [phone]  [facility]    Pharmacy 8am-8pm Monday-Thursday      8am-6pm Friday  9am-5pm Saturday-Sunday [phone]   Urgent Care 11am-8pm Monday-Friday        9am-5pm Saturday-Sunday [phone]     After hours, weekend or if you need to make an appointment with your primary provider please call [phone].   After Hours nurse advise: call [facility]: [phone]    Medication Refills:  Call your pharmacy and they will forward the refill to us. Please allow 3 business days for your refills to be completed.    Use dotSyntax (secure email communication and access to your chart) to send your primary care provider a message or make an appointment. Ask someone on your Team how to sign up for dotSyntax. To log on to [email or web address] or for more information in [employer] please visit the website at [email or web address].  As of October 8, 2013, all password changes, disabled accounts, or ID changes in [email or web address] will be done by our Access Services Department.   If you need help with your account or password, call: [phone]. Clinic staff no longer has the ability to change passwords.

## 2017-08-18 NOTE — PROGRESS NOTES
"  SUBJECTIVE:   Leela Dudley is a 89 year old female who presents to clinic today for the following health issues:  She is accompanied by her daughter.     Hypertension Follow-up      Outpatient blood pressures are not being checked.    Low Salt Diet: no added salt      Amount of exercise or physical activity: None    Problems taking medications regularly: No    Medication side effects: none - hasn't caused urinary side-effects that she experienced previously  Diet: regular (no restrictions)      Past medical, family, and social histories, medications, and allergies are reviewed and updated in Epic.     ROS:  C: NEGATIVE for fever, chills, change in weight  E/M: NEGATIVE for ear, mouth and throat problems  R: NEGATIVE for significant cough or SOB  CV: NEGATIVE for chest pain, palpitations or peripheral edema  ROS otherwise negative    Any history above obtained by the Medical Assistant was reviewed by Dr. Ronnie Arora MD, and edited when necessary.    This document serves as a record of the services and decisions personally performed and made by Dr. Arora. It was created on his behalf by Melvi Jordan, a trained medical scribe. The creation of this document is based the provider's statements to the medical scribe.  Melvi Jordan August 18, 2017 12:15 PM     OBJECTIVE:                                                    /68  Pulse 80  Temp 98.3  F (36.8  C) (Oral)  Ht 1.549 m (5' 1\")  SpO2 97%   There is no height or weight on file to calculate BMI.     GENERAL: healthy, alert and no distress  EYES: Eyes grossly normal to inspection, PERRL, EOMI, sclerae white and conjunctivae normal  MS: no gross musculoskeletal defects noted, no edema  SKIN: no suspicious lesions or rashes  NEURO: Normal strength and tone, sensory exam grossly normal, mentation intact, oriented times 3 and cranial nerves 2-12 intact  PSYCH: mentation appears normal, affect normal/bright     Diagnostic Test Results:  none  "     ASSESSMENT/PLAN:                                                      (I10) Essential hypertension with goal blood pressure less than 140/90  (primary encounter diagnosis)  Comment: Uncontrolled, but significant improvement with addition of hydrochlorothiazide. We have limited choices for further adjustment as discussed at previous visits. Her daughter is hesitant to increase her diltiazem because she is wary of the drug in general, so we will try my other suggestion of adding a fourth medicine.   Plan: cloNIDine (CATAPRES-TTS1) 0.1 MG/24HR WK patch        Follow up in 4 weeks.      The information in this document, created by the medical scribe for me, accurately reflects the services I personally performed and the decisions made by me. I have reviewed and approved this document for accuracy prior to leaving the patient care area. August 18, 2017 12:15 PM   Ronnie Arora MD

## 2017-08-18 NOTE — MR AVS SNAPSHOT
After Visit Summary   8/18/2017    Leela Dudley    MRN: [id number]           Patient Information     Date Of Birth          9/14/1926        Visit Information        Provider Department      8/18/2017 11:40 AM Ronnie Arora MD [facility]        Today's Diagnoses     Essential hypertension with goal blood pressure less than 140/90    -  1      Care Instructions    This summary includes the important diagnoses, test, medications and other important parts of your medical history.  Below are a few good we sites you can use to learn more about these.     Www.Counts include 234 beds at the [facility].org : Up to date and easily searchable information on multiple topics.  Www.Counts include 234 beds at the [facility].Campalyst/Pharmacy/[email or web address] : [place] [facility] $4.99 medications  Www.medlineTSO3.gov : medication info, interactive tutorials, watch real surgeries online  Www.familydoctor.org : good info from the Academy of Family Physicians  Www.[employer] : good info from the [facility]  Www.cdc.gov : public health info, travel advisories, epidemics (H1N1)  Www.aap.org : children's health info, normal development, vaccinations  Www.health.state[place].us : [place] dept of heatlh, public health issues in [place], N1N1    Based on your medical history and these are the current health maintenance or preventive care services that you are due for (some may have been done at this visit:)  Health Maintenance Due   Topic Date Due     ADVANCE DIRECTIVE PLANNING Q5 YRS  04/18/2017     =================================================================================  Normal Values   Blood pressure  <140/90 for most adults    <130/80 for some chronic diseases (ask your care team about yours)    BMI (body mass index)  18.5-25 kg/m2 (based on height and weight)     Thank you for visiting [facility]    Normal or non-critical lab and imaging results will be communicated to you by MyChart, letter or phone within 7 days.  If you do not hear from us  within 10 days, please call the clinic. If you have a critical or abnormal lab result, we will notify you by phone as soon as possible.     If you have any questions regarding your visit please contact:     Team Comfort:   Clinic Hours Telephone Number   Dr. Ronnie Phillips  [place] [place]   7am-5pm  Monday - Friday [phone]  [facility]   Pharmacy 8am-8pm Monday-Thursday      8am-6pm Friday  9am-5pm Saturday-Sunday [phone]   Urgent Care 11am-8pm Monday-Friday        9am-5pm Saturday-Sunday [phone]     After hours, weekend or if you need to make an appointment with your primary provider please call [phone].   After Hours nurse advise: call [place] [facility]: [phone]    Medication Refills:  Call your pharmacy and they will forward the refill to us. Please allow 3 business days for your refills to be completed.    Use ioBridge (secure email communication and access to your chart) to send your primary care provider a message or make an appointment. Ask someone on your Team how to sign up for ioBridge. To log on to [employer] or for more information in Money Dashboard please visit the website at [email or web address].  As of October 8, 2013, all password changes, disabled accounts, or ID changes in ioBridge/MyHealth will be done by our Access Services Department.   If you need help with your account or password, call: [phone]. Clinic staff no longer has the ability to change passwords.             Follow-ups after your visit        Follow-up notes from your care team     Return in about 4 weeks (around 9/15/2017).      Who to contact     If you have questions or need follow up information about today's clinic visit or your schedule please contact [facility] directly at [phone].  Normal or non-critical lab and imaging results will be communicated to you by GeoMetWatchhart, letter or phone within 4 business days after  "the clinic has received the results. If you do not hear from us within 7 days, please contact the clinic through Sarnova or phone. If you have a critical or abnormal lab result, we will notify you by phone as soon as possible.  Submit refill requests through Sarnova or call your pharmacy and they will forward the refill request to us. Please allow 3 business days for your refill to be completed.          Additional Information About Your Visit        [employer]TheGrid Information     Sarnova gives you secure access to your electronic health record. If you see a primary care provider, you can also send messages to your care team and make appointments. If you have questions, please call your primary care clinic.  If you do not have a primary care provider, please call [phone] and they will assist you.        Care EveryWhere ID     This is your Care EveryWhere ID. This could be used by other organizations to access your [place] medical records  [id number]        Your Vitals Were     Pulse Temperature Height Pulse Oximetry          80 98.3  F (36.8  C) (Oral) 5' 1\" (1.549 m) 97%         Blood Pressure from Last 3 Encounters:   08/18/17 150/68   08/04/17 162/76   07/14/17 178/88    Weight from Last 3 Encounters:   08/04/17 133 lb (60.3 kg)   07/14/17 132 lb (59.9 kg)   01/06/17 131 lb (59.4 kg)              We Performed the Following     C PAF COMPLETED  NO CHARGE          Today's Medication Changes          These changes are accurate as of: 8/18/17 12:23 PM.  If you have any questions, ask your nurse or doctor.               Start taking these medicines.        Dose/Directions    cloNIDine 0.1 MG/24HR WK patch   Commonly known as:  CATAPRES-TTS1   Used for:  Essential hypertension with goal blood pressure less than 140/90   Started by:  Ronnie rAora MD        Dose:  1 patch   Place 1 patch onto the skin once a week for blood pressure.   Quantity:  12 patch   Refills:  0            Where to get your medicines    "   These medications were sent to [facility]/pharmacy #[id number] - [place], [place] - [address]  [address] [place] [address]     Phone:  [phone]     cloNIDine 0.1 MG/24HR WK patch                Primary Care Provider Office Phone # Fax #    Ronnie Arora [phone]       [address]  [facility] [place] [address]        Equal Access to Services     [facility]: Hadii aad ku hadasho Soomaali, waaxda luqadaha, qaybta kaalmada adeegyada, waxay idiin hayaan adeeg kharash la'aaalejandrina . So [facility] [phone].    ATENCIÓN: Si habla español, tiene a arzola disposición servicios gratuitos de asistencia lingüística. [facility] [phone].    We comply with applicable federal civil rights laws and [place] laws. We do not discriminate on the basis of race, color, national origin, age, disability sex, sexual orientation or gender identity.            Thank you!     Thank you for choosing [facility]  for your care. Our goal is always to provide you with excellent care. Hearing back from our patients is one way we can continue to improve our services. Please take a few minutes to complete the written survey that you may receive in the mail after your visit with us. Thank you!             Your Updated Medication List - Protect others around you: Learn how to safely use, store and throw away your medicines at www.disposemymeds.org.          This list is accurate as of: 8/18/17 12:23 PM.  Always use your most recent med list.                   Brand Name Dispense Instructions for use Diagnosis    aspirin 81 MG tablet      1 TABLET DAILY        candesartan cilexetil 32 MG Tabs    ATACAND    90 tablet    Take 1 tablet by mouth daily for blood pressure.    Essential hypertension with goal blood pressure less than 140/90       cloNIDine 0.1 MG/24HR WK patch    CATAPRES-TTS1    12 patch    Place 1 patch onto the skin once a week for blood pressure.    Essential hypertension with  goal blood pressure less than 140/90       diltiazem 360 MG 24 hr CD capsule    CARDIZEM CD; CARTIA XT    90 capsule    Take 1 capsule (360 mg) by mouth every evening for blood pressure.    Essential hypertension with goal blood pressure less than 140/90       hydrochlorothiazide 25 MG tablet    HYDRODIURIL    90 tablet    Take 1 tablet (25 mg) by mouth daily for blood pressure.    Essential hypertension with goal blood pressure less than 140/90

## 2017-09-14 ENCOUNTER — OFFICE VISIT (OUTPATIENT)
Dept: FAMILY MEDICINE | Facility: CLINIC | Age: 82
End: 2017-09-14
Payer: COMMERCIAL

## 2017-09-14 VITALS
HEIGHT: 61 IN | SYSTOLIC BLOOD PRESSURE: 134 MMHG | DIASTOLIC BLOOD PRESSURE: 62 MMHG | TEMPERATURE: 97.5 F | BODY MASS INDEX: 25.11 KG/M2 | WEIGHT: 133 LBS | OXYGEN SATURATION: 99 % | HEART RATE: 74 BPM

## 2017-09-14 DIAGNOSIS — I10 ESSENTIAL HYPERTENSION WITH GOAL BLOOD PRESSURE LESS THAN 140/90: Primary | ICD-10-CM

## 2017-09-14 DIAGNOSIS — Z23 NEED FOR PROPHYLACTIC VACCINATION AND INOCULATION AGAINST INFLUENZA: ICD-10-CM

## 2017-09-14 PROCEDURE — 99213 OFFICE O/P EST LOW 20 MIN: CPT | Mod: 25 | Performed by: FAMILY MEDICINE

## 2017-09-14 PROCEDURE — G0008 ADMIN INFLUENZA VIRUS VAC: HCPCS | Performed by: FAMILY MEDICINE

## 2017-09-14 PROCEDURE — 90662 IIV NO PRSV INCREASED AG IM: CPT | Performed by: FAMILY MEDICINE

## 2017-09-14 ASSESSMENT — PAIN SCALES - GENERAL: PAINLEVEL: NO PAIN (0)

## 2017-09-14 NOTE — PROGRESS NOTES
"  SUBJECTIVE:   Leela Dudley is a 89 year old female who presents to clinic today for the following health issues:  She is accompanied by her daughter.     Hypertension Follow-up      Outpatient blood pressures are being checked at home.  Results are good - ~120s (but half the time in 130-140s)    Low Salt Diet: no added salt      Amount of exercise or physical activity: None    Problems taking medications regularly: No - patch is not giving the patient any trouble    Medication side effects: none  Diet: regular (no restrictions)      Past medical, family, and social histories, medications, and allergies are reviewed and updated in Epic.     ROS:  C: NEGATIVE for fever, chills, change in weight  E/M: NEGATIVE for ear, mouth and throat problems  R: NEGATIVE for significant cough or SOB  CV: NEGATIVE for chest pain, palpitations or peripheral edema  ROS otherwise negative    Any history above obtained by the Medical Assistant was reviewed by Dr. Ronnie Arroa MD, and edited when necessary.    This document serves as a record of the services and decisions personally performed and made by Dr. Arora. It was created on his behalf by Melvi Jordan, a trained medical scribe. The creation of this document is based the provider's statements to the medical scribe.  Melvi Jordan September 14, 2017 12:08 PM     OBJECTIVE:                                                    /62  Pulse 74  Temp 97.5  F (36.4  C) (Oral)  Ht 1.549 m (5' 1\")  Wt 60.3 kg (133 lb)  SpO2 99%  BMI 25.13 kg/m2   Body mass index is 25.13 kg/(m^2).     GENERAL: healthy, alert and no distress  EYES: Eyes grossly normal to inspection, PERRL, EOMI, sclerae white and conjunctivae normal  RESP: lungs clear to auscultation - no crackles or wheezes, no areas of dullness, no tachypnea  CV: Heart regular rate and rhythm without murmur, click or rub. No peripheral edema and peripheral pulses strong  MS: no gross musculoskeletal defects " noted, no edema  SKIN: no suspicious lesions or rashes  NEURO: Normal strength and tone, sensory exam grossly normal, mentation intact, oriented times 3 and cranial nerves 2-12 intact  PSYCH: mentation appears normal, affect normal/bright     Diagnostic Test Results:  none      ASSESSMENT/PLAN:                                                      (I10) Essential hypertension with goal blood pressure less than 140/90  (primary encounter diagnosis)  Comment: Well controlled on current regimen (four meds). The daughter inquires about the possibility of stopping HCTZ. I recommend against it since she is finally controlled.   Plan: Follow up in early October if she discontinues hydrochlorothiazide, follow up otherwise in early January. Ok for RNs to refill through the end of January.    (Z23) Need for prophylactic vaccination and inoculation against influenza  Comment: Influenza vaccine offered and accepted by patient. She has received it before without problems.   Plan: FLU VACCINE, INCREASED ANTIGEN, PRESV FREE, AGE        65+ [43150],      ADMIN VACCINE, FIRST [94072]              The information in this document, created by the medical scribe for me, accurately reflects the services I personally performed and the decisions made by me. I have reviewed and approved this document for accuracy prior to leaving the patient care area. September 14, 2017 12:08 PM   Ronnie Arora MD

## 2017-09-14 NOTE — MR AVS SNAPSHOT
After Visit Summary   9/14/2017    Leela Dudley    MRN: [id number]           Patient Information     Date Of Birth          9/14/1926        Visit Information        Provider Department      9/14/2017 12:00 PM Ronnie Arora MD [facility]        Today's Diagnoses     Need for prophylactic vaccination and inoculation against influenza    -  1      Care Instructions        ================================================================================  Normal Values   Blood pressure  <140/90 for most adults    <130/80 for some chronic diseases (ask your care team about yours)    BMI (body mass index)  18.5-25 kg/m2 (based on height and weight)     Thank you for visiting [facility]    Normal or non-critical lab and imaging results will be communicated to you by MyChart, letter or phone within 7 days.  If you do not hear from us within 10 days, please call the clinic. If you have a critical or abnormal lab result, we will notify you by phone as soon as possible.     If you have any questions regarding your visit please contact:     Team Comfort:   Clinic Hours Telephone Number   Dr. Ronnie Krueger 7am-5pm  Monday - Friday [phone]  Natali Lin RN   Pharmacy 8:00am-8pm Monday-Friday    9am-5pm Saturday-Sunday [phone]   Urgent Care 11am-9pm Monday-Friday        9am-5pm Saturday-Sunday [phone]     After hours, weekend or if you need to make an appointment with your primary provider please call [phone].   After Hours nurse advise: call [place] [facility]: [phone]    Medication Refills:  Call your pharmacy and they will forward the refill to us. Please allow 3 business days for your refills to be completed.                  Follow-ups after your visit        Follow-up notes from your care team     Return in about 3 weeks (around 10/6/2017) for blood  "pressure check if you stop the HCTZ (diuretic), otherwise return approximately 1/7/18.      Who to contact     If you have questions or need follow up information about today's clinic visit or your schedule please contact [facility] directly at [phone].  Normal or non-critical lab and imaging results will be communicated to you by MyChart, letter or phone within 4 business days after the clinic has received the results. If you do not hear from us within 7 days, please contact the clinic through Cobiscorphart or phone. If you have a critical or abnormal lab result, we will notify you by phone as soon as possible.  Submit refill requests through LiquiGlide or call your pharmacy and they will forward the refill request to us. Please allow 3 business days for your refill to be completed.          Additional Information About Your Visit        MyChart Information     LiquiGlide gives you secure access to your electronic health record. If you see a primary care provider, you can also send messages to your care team and make appointments. If you have questions, please call your primary care clinic.  If you do not have a primary care provider, please call [phone] and they will assist you.        Care EveryWhere ID     This is your Care EveryWhere ID. This could be used by other organizations to access your [place] medical records  [id number]        Your Vitals Were     Pulse Temperature Height Pulse Oximetry BMI (Body Mass Index)       74 97.5  F (36.4  C) (Oral) 5' 1\" (1.549 m) 99% 25.13 kg/m2        Blood Pressure from Last 3 Encounters:   09/14/17 134/62   08/18/17 150/68   08/04/17 162/76    Weight from Last 3 Encounters:   09/14/17 133 lb (60.3 kg)   08/04/17 133 lb (60.3 kg)   07/14/17 132 lb (59.9 kg)              We Performed the Following          ADMIN VACCINE, FIRST [41468]     FLU VACCINE, INCREASED ANTIGEN, PRESV FREE, AGE 65+ [01652]        Primary Care Provider Office Phone # Fax #    " Ronnie Arora [phone]       [address]  [facility] [address]        Equal Access to Services     JULISA PATEL : Hadii luis ku hadlio Soomaali, waaxda luqadaha, qaybta kaalmada adeegyada, andreina tee laderrickalejandrina leija. So [facility] [phone].    ATENCIÓN: Si habla español, tiene a arzola disposición servicios gratuitos de asistencia lingüística. Llame al [phone].    We comply with applicable federal civil rights laws and [place] laws. We do not discriminate on the basis of race, color, national origin, age, disability sex, sexual orientation or gender identity.            Thank you!     Thank you for choosing [facility]  for your care. Our goal is always to provide you with excellent care. Hearing back from our patients is one way we can continue to improve our services. Please take a few minutes to complete the written survey that you may receive in the mail after your visit with us. Thank you!             Your Updated Medication List - Protect others around you: Learn how to safely use, store and throw away your medicines at www.disposemymeds.org.          This list is accurate as of: 9/14/17 12:20 PM.  Always use your most recent med list.                   Brand Name Dispense Instructions for use Diagnosis    aspirin 81 MG tablet      1 TABLET DAILY        candesartan cilexetil 32 MG Tabs    ATACAND    90 tablet    Take 1 tablet by mouth daily for blood pressure.    Essential hypertension with goal blood pressure less than 140/90       cloNIDine 0.1 MG/24HR WK patch    CATAPRES-TTS1    12 patch    Place 1 patch onto the skin once a week for blood pressure.    Essential hypertension with goal blood pressure less than 140/90       diltiazem 360 MG 24 hr CD capsule    CARDIZEM CD; CARTIA XT    90 capsule    Take 1 capsule (360 mg) by mouth every evening for blood pressure.    Essential hypertension with goal blood pressure less than 140/90        hydrochlorothiazide 25 MG tablet    HYDRODIURIL    90 tablet    Take 1 tablet (25 mg) by mouth daily for blood pressure.    Essential hypertension with goal blood pressure less than 140/90

## 2017-09-14 NOTE — PROGRESS NOTES
Injectable Influenza Immunization Documentation    1.  Are you sick today? (Fever of 100.5 or higher on the day of the clinic)   No    2.  Have you ever had Guillain-[place] Syndrome within 6 weeks of an influenza vaccionation?  No    3. Do you have a life-threatening allergy to eggs?  No    4. Do you have a life-threatening allergy to a component of the vaccine? May include antibiotics, gelatin or latex.  No     5. Have you ever had a reaction to a dose of flu vaccine that needed immediate medical attention?  No     Form completed by GINI Carvajal MA

## 2017-09-14 NOTE — NURSING NOTE
"Chief Complaint   Patient presents with     Hypertension       Initial /72 (BP Location: Right arm, Patient Position: Chair, Cuff Size: Adult Regular)  Pulse 74  Temp 97.5  F (36.4  C) (Oral)  Ht 5' 1\" (1.549 m)  Wt 133 lb (60.3 kg)  SpO2 99%  BMI 25.13 kg/m2 Estimated body mass index is 25.13 kg/(m^2) as calculated from the following:    Height as of this encounter: 5' 1\" (1.549 m).    Weight as of this encounter: 133 lb (60.3 kg).  Medication Reconciliation: samantha Carvajal MA      "

## 2017-11-04 DIAGNOSIS — I10 ESSENTIAL HYPERTENSION WITH GOAL BLOOD PRESSURE LESS THAN 140/90: ICD-10-CM

## 2017-11-05 DIAGNOSIS — I10 ESSENTIAL HYPERTENSION WITH GOAL BLOOD PRESSURE LESS THAN 140/90: ICD-10-CM

## 2017-11-08 RX ORDER — HYDROCHLOROTHIAZIDE 25 MG/1
TABLET ORAL
Qty: 90 TABLET | Refills: 0 | Status: SHIPPED | OUTPATIENT
Start: 2017-11-08 | End: 2018-01-09

## 2017-11-08 RX ORDER — CANDESARTAN 32 MG/1
TABLET ORAL
Qty: 90 TABLET | Refills: 0 | Status: SHIPPED | OUTPATIENT
Start: 2017-11-08 | End: 2018-01-09

## 2017-11-08 NOTE — TELEPHONE ENCOUNTER
Prescription approved per [facility] Refill Protocol.  Per 9/14/17 OV, OK for RN's to fill through end of January.     Connie Jordan MSN, RN-BC  Care Coordinator

## 2017-11-08 NOTE — TELEPHONE ENCOUNTER
Prescription approved per [facility] Refill Protocol.    Monserrat Sorensen RN

## 2018-01-09 ENCOUNTER — OFFICE VISIT (OUTPATIENT)
Dept: FAMILY MEDICINE | Facility: CLINIC | Age: 83
End: 2018-01-09
Payer: COMMERCIAL

## 2018-01-09 VITALS
DIASTOLIC BLOOD PRESSURE: 48 MMHG | BODY MASS INDEX: 25.11 KG/M2 | WEIGHT: 133 LBS | HEART RATE: 70 BPM | OXYGEN SATURATION: 97 % | HEIGHT: 61 IN | SYSTOLIC BLOOD PRESSURE: 110 MMHG | TEMPERATURE: 98.2 F

## 2018-01-09 DIAGNOSIS — R13.10 DYSPHAGIA, UNSPECIFIED TYPE: ICD-10-CM

## 2018-01-09 DIAGNOSIS — F32.0 MAJOR DEPRESSIVE DISORDER, SINGLE EPISODE, MILD (H): ICD-10-CM

## 2018-01-09 DIAGNOSIS — R73.01 IMPAIRED FASTING GLUCOSE: ICD-10-CM

## 2018-01-09 DIAGNOSIS — I10 ESSENTIAL HYPERTENSION WITH GOAL BLOOD PRESSURE LESS THAN 140/90: Primary | ICD-10-CM

## 2018-01-09 LAB
ANION GAP SERPL CALCULATED.3IONS-SCNC: 11 MMOL/L (ref 3–14)
BUN SERPL-MCNC: 34 MG/DL (ref 7–30)
CALCIUM SERPL-MCNC: 9.4 MG/DL (ref 8.5–10.1)
CHLORIDE SERPL-SCNC: 104 MMOL/L (ref 94–109)
CO2 SERPL-SCNC: 25 MMOL/L (ref 20–32)
CREAT SERPL-MCNC: 0.82 MG/DL (ref 0.52–1.04)
CREAT UR-MCNC: 173 MG/DL
GFR SERPL CREATININE-BSD FRML MDRD: 65 ML/MIN/1.7M2
GLUCOSE SERPL-MCNC: 193 MG/DL (ref 70–99)
HBA1C MFR BLD: 6.8 % (ref 4.3–6)
MICROALBUMIN UR-MCNC: 11 MG/L
MICROALBUMIN/CREAT UR: 6.53 MG/G CR (ref 0–25)
POTASSIUM SERPL-SCNC: 3.5 MMOL/L (ref 3.4–5.3)
SODIUM SERPL-SCNC: 140 MMOL/L (ref 133–144)

## 2018-01-09 PROCEDURE — 80048 BASIC METABOLIC PNL TOTAL CA: CPT | Performed by: FAMILY MEDICINE

## 2018-01-09 PROCEDURE — 99214 OFFICE O/P EST MOD 30 MIN: CPT | Performed by: FAMILY MEDICINE

## 2018-01-09 PROCEDURE — 83036 HEMOGLOBIN GLYCOSYLATED A1C: CPT | Performed by: FAMILY MEDICINE

## 2018-01-09 PROCEDURE — 82043 UR ALBUMIN QUANTITATIVE: CPT | Performed by: FAMILY MEDICINE

## 2018-01-09 PROCEDURE — 36415 COLL VENOUS BLD VENIPUNCTURE: CPT | Performed by: FAMILY MEDICINE

## 2018-01-09 RX ORDER — CANDESARTAN 32 MG/1
1 TABLET ORAL DAILY
Qty: 90 TABLET | Refills: 1 | Status: SHIPPED | OUTPATIENT
Start: 2018-01-09 | End: 2018-07-01

## 2018-01-09 RX ORDER — DILTIAZEM HYDROCHLORIDE 360 MG/1
360 CAPSULE, EXTENDED RELEASE ORAL EVERY EVENING
Qty: 90 CAPSULE | Refills: 1 | Status: SHIPPED | OUTPATIENT
Start: 2018-01-09

## 2018-01-09 RX ORDER — HYDROCHLOROTHIAZIDE 25 MG/1
25 TABLET ORAL DAILY
Qty: 90 TABLET | Refills: 1 | Status: SHIPPED | OUTPATIENT
Start: 2018-01-09

## 2018-01-09 ASSESSMENT — PAIN SCALES - GENERAL: PAINLEVEL: NO PAIN (0)

## 2018-01-09 NOTE — PATIENT INSTRUCTIONS
At [facility], we strive to deliver an exceptional experience to you, every time we see you.  If you receive a survey in the mail, please send us back your thoughts. We really do value your feedback.    Based on your medical history, these are the current health maintenance/preventive care services that you are due for (some may have been done at this visit.)  Health Maintenance Due   Topic Date Due     BMP Q1 YR  01/06/2018     MICROALBUMIN Q1 YEAR  01/06/2018     DEPRESSION ACTION PLAN Q1 YR 01/06/2018     PHQ-9 Q6 MONTHS  01/14/2018         Suggested websites for health information:  Www.Dizkon.BodBot : Up to date and easily searchable information on multiple topics.  Www.medlineplus.gov : medication info, interactive tutorials, watch real surgeries online  Www.familydoctor.org : good info from the Academy of Family Physicians  Www.cdc.gov : public health info, travel advisories, epidemics (H1N1)  Www.aap.org : children's health info, normal development, vaccinations  Www.health.[facility].[place].us : [place] dept of health, public health issues in [place], N1N1    Your care team:                            Family Medicine Internal Medicine   MD Luis Alberto Herbert MD Shantel Branch-Fleming, MD Katya Georgiev PA-C Nam Ho, MD Pediatrics   TEA Mercado, TORIN Pineda APRN CNP   MD Destiny Reinoso MD Deborah Mielke, MD Kim Thein, APRN CNP      Clinic hours: Monday - Thursday 7 am-7 pm; Fridays 7 am-5 pm.   Urgent care: Monday - Friday 11 am-9 pm; Saturday and Sunday 9 am-5 pm.  Pharmacy : Monday -Thursday 8 am-8 pm; Friday 8 am-6 pm; Saturday and Sunday 9 am-5 pm.     Clinic: [phone]   Pharmacy: [phone]

## 2018-01-09 NOTE — PROGRESS NOTES
"  SUBJECTIVE:   Leela Dudley is a 89 year old female who presents to clinic today for the following health issues:  She is accompanied by her daughter.     Medication Followup of All    Taking Medication as prescribed: yes    Side Effects:  None    Medication Helping Symptoms:  yes     Hypertension:  Daughter checks patient's blood pressure every time she changes the clonidine patch, and she gets systolic pressures around 115-110. Daughter reports a hard time keeping the clonidine patch on - it falls off and won't re-adhere even though daughter puts tape on it as well.     Dysphagia:  Daughter reports this is becoming more of an issue and when the patient eats, she does not swallow so eventually has to stop eating. Patient has never had a swallow study done, per daughter's report.     Other concern:  Daughter is wondering about a person who helps patient from time to time and travels outside the country often ([place]). Daughter wants to know if patient can possibly get sick due to this potential exposure (the helper is asymptomatic).     Past medical, family, and social histories, medications, and allergies are reviewed and updated in Epic.     ROS:  C: NEGATIVE for fever, chills, change in weight  E/M: NEGATIVE for ear, mouth and throat problems  R: NEGATIVE for significant cough or SOB  CV: NEGATIVE for chest pain, palpitations or peripheral edema  PSYCH: PHQ-9 = 1.   ROS otherwise negative    This document serves as a record of the services and decisions personally performed and made by Dr. Arora. It was created on his behalf by Melvi Jordan, a trained medical scribe. The creation of this document is based the provider's statements to the medical scribe.  Melvi Jordan January 9, 2018 1:23 PM     OBJECTIVE:                                                    /48  Pulse 70  Temp 98.2  F (36.8  C) (Oral)  Ht 1.549 m (5' 1\")  Wt 60.3 kg (133 lb)  SpO2 97%  BMI 25.13 kg/m2   Body mass index " is 25.13 kg/(m^2).     GENERAL: healthy, alert and no distress  EYES: Eyes grossly normal to inspection, PERRL, EOMI, sclerae white and conjunctivae normal  RESP: lungs clear to auscultation - no crackles or wheezes, no areas of dullness, no tachypnea  CV: Heart regular rate and rhythm without murmur, click or rub. No peripheral edema and peripheral pulses strong  MS: no gross musculoskeletal defects noted, no edema  SKIN: no suspicious lesions or rashes  NEURO: Normal strength and tone, sensory exam grossly normal, mentation intact, oriented times 3 and cranial nerves 2-12 intact  PSYCH: mentation appears normal, affect normal/bright     Diagnostic Test Results:  none      ASSESSMENT/PLAN:                                                      (I10) Essential hypertension with goal blood pressure less than 140/90  (primary encounter diagnosis)  Comment: Well controlled.   Plan: BASIC METABOLIC PANEL, Albumin Random Urine         Quantitative with Creat Ratio, candesartan         cilexetil 32 MG TABS, cloNIDine (CATAPRES-TTS1)        0.1 MG/24HR WK patch, diltiazem (CARDIZEM CD;         CARTIA XT) 360 MG 24 hr CD capsule,         hydrochlorothiazide (HYDRODIURIL) 25 MG tablet,        order for DME        Return in about 6 months (around 7/9/2018) for blood pressure check, recheck medications, lab tests.     (R13.10) Dysphagia, unspecified type  Comment: Diagnosis was told to daughter during a hospitalization. Daughter feels this is affecting her overall food/caloric intake. I think the diagnosis needs to be confirmed and eating recommendations should be made to avoid undesired weight loss.   Plan: SPEECH THERAPY REFERRAL, SPEECH THERAPY         REFERRAL, XR Video Swallow w Esophagram        Weight graph provided.     (R73.01) Impaired fasting glucose  Comment: Last HgbA1c = 6.2.   Plan: BASIC METABOLIC PANEL, Hemoglobin A1c        Follow up as needed.     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 Currently not taking medication.   Plan: DEPRESSION ACTION PLAN (DAP)        Monitor periodically.       The information in this document, created by the medical scribe for me, accurately reflects the services I personally performed and the decisions made by me. I have reviewed and approved this document for accuracy prior to leaving the patient care area. January 9, 2018 1:23 PM   Ronnie Arora MD

## 2018-01-09 NOTE — MR AVS SNAPSHOT
After Visit Summary   1/9/2018    Leela Dudley    MRN: [id number]           Patient Information     Date Of Birth          9/14/1926        Visit Information        Provider Department      1/9/2018 1:20 PM Ronnie Arora MD [facility]        Today's Diagnoses     Essential hypertension with goal blood pressure less than 140/90    -  1    Dysphagia, unspecified type        Impaired fasting glucose        Major depressive disorder, single episode, mild (H)          Care Instructions    At [facility], we strive to deliver an exceptional experience to you, every time we see you.  If you receive a survey in the mail, please send us back your thoughts. We really do value your feedback.    Based on your medical history, these are the current health maintenance/preventive care services that you are due for (some may have been done at this visit.)  Health Maintenance Due   Topic Date Due     BMP Q1 YR  01/06/2018     MICROALBUMIN Q1 YEAR  01/06/2018     DEPRESSION ACTION PLAN Q1 YR  01/06/2018     PHQ-9 Q6 MONTHS  01/14/2018         Suggested websites for health information:  Www.[employer] : Up to date and easily searchable information on multiple topics.  Www.medlineplus.gov : medication info, interactive tutorials, watch real surgeries online  Www.familydoctor.org : good info from the Academy of Family Physicians  Www.cdc.gov : public health info, travel advisories, epidemics (H1N1)  Www.aap.org : children's health info, normal development, vaccinations  Www.health.state.[place].us : [place] dept of health, public health issues in [place], N1N1    Your care team:                            Family Medicine Internal Medicine   MD Luis Alberto Herbert MD Shantel Branch-Fleming, MD Katya Georgiev PA-C Nam Ho, MD Pediatrics   TEA Mercado, CNP MD Destiny Pugh CNP, MD Deborah Mielke, MD Kim  "ADAM Blake CNP      Clinic hours: Monday - Thursday 7 am-7 pm; Fridays 7 am-5 pm.   Urgent care: Monday - Friday 11 am-9 pm; Saturday and Sunday 9 am-5 pm.  Pharmacy : Monday -Thursday 8 am-8 pm; Friday 8 am-6 pm; Saturday and Sunday 9 am-5 pm.     Clinic: [phone]   Pharmacy: [phone]            Follow-ups after your visit        Additional Services     SPEECH THERAPY REFERRAL       *This therapy referral will be filtered to a centralized scheduling office at [facility] and the patient will receive a call to schedule an appointment at a [place] location most convenient for them. *     [facility] provides Speech Therapy evaluation and treatment and many specialty services across the [facility].  If requesting a specialty program, please choose from the list below.  If you have not heard from the scheduling office within 2 business days, please call [phone] for all locations, with the exception of [place], please call [phone].       Treatment: Evaluation & Treatment  Speech Treatment Diagnosis: Dysphagia  Special Instructions:   Special Programs: Clinical Swallow Study    Please be aware that coverage of these services is subject to the terms and limitations of your health insurance plan.  Call member services at your health plan with any benefit or coverage questions.      **Note to Provider:  If you are referring outside of [place] for the therapy appointment, please list the name of the location in the \"special instructions\" above, print the referral and give to the patient to schedule the appointment.            SPEECH THERAPY REFERRAL       *This therapy referral will be filtered to a centralized scheduling office at [facility] and the patient will receive a call to schedule an appointment at a [place] location most convenient for them. *     [facility] provides Speech Therapy evaluation and " "treatment and many specialty services across the [place] system.  If requesting a specialty program, please choose from the list below.  If you have not heard from the scheduling office within 2 business days, please call [phone] for all locations, with the exception of [place], please call [phone].       Treatment: Evaluation & Treatment  Speech Treatment Diagnosis: Dysphagia  Special Instructions:   Special Programs: Video Swallow Study    Please be aware that coverage of these services is subject to the terms and limitations of your health insurance plan.  Call member services at your health plan with any benefit or coverage questions.      **Note to Provider:  If you are referring outside of [place] for the therapy appointment, please list the name of the location in the \"special instructions\" above, print the referral and give to the patient to schedule the appointment.                  Follow-up notes from your care team     Return in about 6 months (around 7/9/2018) for blood pressure check, recheck medications, lab tests.      Future tests that were ordered for you today     Open Future Orders        Priority Expected Expires Ordered    XR Video Swallow w Esophagram Routine 1/9/2018 1/9/2019 1/9/2018            Who to contact     If you have questions or need follow up information about today's clinic visit or your schedule please contact [facility] directly at [phone].  Normal or non-critical lab and imaging results will be communicated to you by MyChart, letter or phone within 4 business days after the clinic has received the results. If you do not hear from us within 7 days, please contact the clinic through MyChart or phone. If you have a critical or abnormal lab result, we will notify you by phone as soon as possible.  Submit refill requests through [employer] or call your pharmacy and they will forward the refill request to us. Please allow 3 business days for your " "refill to be completed.          Additional Information About Your Visit        [employer] Information     [employer] gives you secure access to your electronic health record. If you see a primary care provider, you can also send messages to your care team and make appointments. If you have questions, please call your primary care clinic.  If you do not have a primary care provider, please call [phone] and they will assist you.        Care EveryWhere ID     This is your Care EveryWhere ID. This could be used by other organizations to access your [place] medical records  [id number]        Your Vitals Were     Pulse Temperature Height Pulse Oximetry BMI (Body Mass Index)       70 98.2  F (36.8  C) (Oral) 5' 1\" (1.549 m) 97% 25.13 kg/m2        Blood Pressure from Last 3 Encounters:   01/09/18 110/48   09/14/17 134/62   08/18/17 150/68    Weight from Last 3 Encounters:   01/09/18 133 lb (60.3 kg)   09/14/17 133 lb (60.3 kg)   08/04/17 133 lb (60.3 kg)              We Performed the Following     Albumin Random Urine Quantitative with Creat Ratio     BASIC METABOLIC PANEL     DEPRESSION ACTION PLAN (DAP)     Hemoglobin A1c     SPEECH THERAPY REFERRAL     SPEECH THERAPY REFERRAL          Today's Medication Changes          These changes are accurate as of: 1/9/18  1:51 PM.  If you have any questions, ask your nurse or doctor.               These medicines have changed or have updated prescriptions.        Dose/Directions    candesartan cilexetil 32 MG Tabs   This may have changed:  See the new instructions.   Used for:  Essential hypertension with goal blood pressure less than 140/90   Changed by:  Ronnie Arora MD        Dose:  1 tablet   Take 1 tablet by mouth daily for blood pressure.   Quantity:  90 tablet   Refills:  1       cloNIDine 0.1 MG/24HR WK patch   Commonly known as:  CATAPRES-TTS1   This may have changed:  See the new instructions.   Used for:  Essential hypertension with goal blood pressure less than " 140/90   Changed by:  Ronnie Arora MD        Dose:  1 patch   Place 1 patch onto the skin once a week for blood pressure.   Quantity:  13 patch   Refills:  1       hydrochlorothiazide 25 MG tablet   Commonly known as:  HYDRODIURIL   This may have changed:  See the new instructions.   Used for:  Essential hypertension with goal blood pressure less than 140/90   Changed by:  Ronnie Arora MD        Dose:  25 mg   Take 1 tablet (25 mg) by mouth daily for blood pressure.   Quantity:  90 tablet   Refills:  1            Where to get your medicines      These medications [id number] - [place], [place] - [address]  [address] [place] [address]     Phone:  [phone]     candesartan cilexetil 32 MG Tabs    cloNIDine 0.1 MG/24HR WK patch    diltiazem 360 MG 24 hr CD capsule    hydrochlorothiazide 25 MG tablet                Primary Care Provider Office Phone # Fax #    Ronnie Arora [phone]       [address]  [facility] [address]        Equal Access to Services     [facility] AH: Hadii aad ku hadasho Soomaali, waaxda luqadaha, qaybta kaalmada adeegyada, waxay idiin hayaan adeeg kharash labrendon . So [facility] [phone].    ATENCIÓN: Si habla español, tiene a arzola disposición servicios gratuitos de asistencia lingüística. [facility] [phone].    We comply with applicable federal civil rights laws and [place] laws. We do not discriminate on the basis of race, color, national origin, age, disability, sex, sexual orientation, or gender identity.            Thank you!     Thank you for choosing [facility]  for your care. Our goal is always to provide you with excellent care. Hearing back from our patients is one way we can continue to improve our services. Please take a few minutes to complete the written survey that you may receive in the mail after your visit with us. Thank you!             Your Updated Medication  List - Protect others around you: Learn how to safely use, store and throw away your medicines at www.disposemymeds.org.          This list is accurate as of: 1/9/18  1:51 PM.  Always use your most recent med list.                   Brand Name Dispense Instructions for use Diagnosis    aspirin 81 MG tablet      1 TABLET DAILY        candesartan cilexetil 32 MG Tabs     90 tablet    Take 1 tablet by mouth daily for blood pressure.    Essential hypertension with goal blood pressure less than 140/90       cloNIDine 0.1 MG/24HR WK patch    CATAPRES-TTS1    13 patch    Place 1 patch onto the skin once a week for blood pressure.    Essential hypertension with goal blood pressure less than 140/90       diltiazem 360 MG 24 hr CD capsule    CARDIZEM CD; CARTIA XT    90 capsule    Take 1 capsule (360 mg) by mouth every evening for blood pressure.    Essential hypertension with goal blood pressure less than 140/90       hydrochlorothiazide 25 MG tablet    HYDRODIURIL    90 tablet    Take 1 tablet (25 mg) by mouth daily for blood pressure.    Essential hypertension with goal blood pressure less than 140/90

## 2018-01-19 ENCOUNTER — HOSPITAL ENCOUNTER (OUTPATIENT)
Dept: SPEECH THERAPY | Facility: CLINIC | Age: 83
Setting detail: THERAPIES SERIES
End: 2018-01-19
Attending: FAMILY MEDICINE
Payer: COMMERCIAL

## 2018-01-19 PROCEDURE — 92610 EVALUATE SWALLOWING FUNCTION: CPT | Mod: GN | Performed by: STUDENT IN AN ORGANIZED HEALTH CARE EDUCATION/TRAINING PROGRAM

## 2018-01-19 PROCEDURE — G8996 SWALLOW CURRENT STATUS: HCPCS | Mod: GN,CJ | Performed by: STUDENT IN AN ORGANIZED HEALTH CARE EDUCATION/TRAINING PROGRAM

## 2018-01-19 PROCEDURE — G8997 SWALLOW GOAL STATUS: HCPCS | Mod: GN,CJ | Performed by: STUDENT IN AN ORGANIZED HEALTH CARE EDUCATION/TRAINING PROGRAM

## 2018-01-19 PROCEDURE — G8998 SWALLOW D/C STATUS: HCPCS | Mod: GN,CJ | Performed by: STUDENT IN AN ORGANIZED HEALTH CARE EDUCATION/TRAINING PROGRAM

## 2018-01-19 PROCEDURE — 40000211 ZZHC STATISTIC SLP  DEPARTMENT VISIT: Performed by: STUDENT IN AN ORGANIZED HEALTH CARE EDUCATION/TRAINING PROGRAM

## 2018-01-19 NOTE — DISCHARGE INSTRUCTIONS
Swallowing Recommendations 1/19/18    1. I recommend getting an X-ray swallow study done to visualize your swallow and to make specific recommendations  2. Practice the following safe swallow strategies during meals  a. Go slow and focus on swallowing  b. Sit upright (90 degrees)  c. Take small bites and sips  d. Alternate sips of liquid every few bites  e. Chew food well (bits of food should be smaller than a quarter when you swallow them).  Eating softer, moist foods may be easier to swallow.  3. Maintain good oral hygiene (brush teeth regularly)  4. Pay attention to signs of things potentially going down the wrong way when swallowing  a. Coughing/choking  b. Throat clearing  c. Change in voice quality, especially a wet, gurgly quality

## 2018-01-19 NOTE — PROGRESS NOTES
"[facility] Clinical Swallow Evaluation     01/19/18 1300   General Information   Type Of Visit Initial   Start Of Care Date 01/19/18   Referring Physician Benja Arora MD   Orders Evaluate And Treat   Orders Comment Clinical swallow study   Medical Diagnosis Dysphagia   Onset Of Illness/injury Or Date Of Surgery 01/09/18  (order date)   Precautions/limitations Swallowing Precautions;Fall Precautions  (Pt in wheelchair)   Hearing WFL for 1:1 conversation in session   Pertinent History of Current Problem/OT: Additional Occupational Profile Info 89yo female presenting with daily choking during meals.  Pt's daughter feels like swallowing has been worse since a fall a couple of years ago.  Pt reports that swallowing is sometimes \"hard,\" more with solid foods than with liquids.  She notes that swallowing tends to be worse in the mornings.  She clears her throat frequently d/t a sensation of phlegm.  She does not feel that the throat clearing increases during/after meals.  She also denies a sensation of food being stuck in the throat, or of things going down the wrong way with swallowing.  Pt has been losing weight, as sometimes she can't get through a meal because of the choking.  She sometimes uses Boost as a nutitional supplement.  No recent pneumonias.  PMH significant for falls, HTN, impaired fasting glucose, depression.   Respiratory Status Room air   Prior Level Of Function Swallowing   Prior Level Of Function Comment Pt currently consuming regular diet with thin liquids.   Patient Role/employment History Retired   Living Environment [facility]   Home/community Accessibility Comments (flowsheet Row) Lives alone, with some assistance from daughter   General Observations Pt was accompanied by her daughter, who helped to provide some of the history.   Patient/family Goals To reduce choking during meals   Clinical Swallow Evaluation   Oral Musculature generally intact   Structural Abnormalities " none present   Dentition present and adequate   Mucosal Quality adequate   Mandibular Strength and Mobility intact   Oral Labial Strength and Mobility impaired coordination   Lingual Strength and Mobility impaired coordination   Velar Elevation intact   Buccal Strength and Mobility other (see comments)  (Mildly reduced tone)   Laryngeal Function Cough;Throat clear;Swallow;Voicing initiated;Dry swallow palpated   Oral Musculature Comments Pt demonstrating some mildly impaired coordination in the lingual and labial muscles, requiring max cues/models to complete some of the tasks in the oral mechanism exam (e.g. pushing tongue into cheek, puffing out cheeks).  Pt observed to clear her throat frequently throughout the session.  Throat clears before PO trials were dry, with intermittent productive throat clear during PO trials, especially with solids (see additional documentation below).  Voice quality is normal.  Laryngeal elevation on palpation of dry swallow is delayed.   Additional Documentation Yes   Additional evaluation(s) completed today No   Clinical Swallow Eval: Thin Liquid Texture Trial   Mode of Presentation, Thin Liquids cup;self-fed   Volume of Liquid or Food Presented Small large sips of water x5   Oral Phase of Swallow Premature pharyngeal entry   Pharyngeal Phase of Swallow throat clearing;wet vocal quality after swallow   Successful Strategies Trialed During Procedure other (see comments)  (small sips)   Diagnostic Statement Immediate throat clearing observed in 4/5 trials, with change in voice quality in 1/5 trials.  Pt denies sensation of liquid going down the wrong way.   Clinical Swallow Eval: Puree Solid Texture Trial   Mode of Presentation, Puree spoon;self-fed   Volume of Puree Presented 1/2 Tbs of applesauce x3   Oral Phase, Puree WFL   Pharyngeal Phase, Puree throat clearing;wet vocal quality after swallow;repeated swallows   Diagnostic Statement Immediate throat clear in 1/3 trials, delayed  throat clear in the other 2/3 trials, with change in voice quality in 1/3 trials.   Clinical Swallow Eval: Semisolid Texture Trial   Mode of Presentation, Semisolid spoon;self-fed   Volume of Semisolid Food Presented 2 pieces of pear in sauce x2   Oral Phase, Semisolid Poor AP movement   Oral Residue, Semisolid mid posterior tongue   Pharyngeal Phase, Semisolid repeated swallows;throat clearing   Successful Strategies Trialed During Procedure, Semisolid other (see comments)  (Thin liquid wash)   Diagnostic Statement Prolonged oral prep phase with multiple swallows despite small quantity, immediate throat clear in 2/2 trials.   Clinical Swallow Eval: Solid Food Texture Trial   Mode of Presentation, Solid self-fed   Volume of Solid Food Presented 1/4 Adrianna Doone cookie x2, 1/2 cookie x1   Oral Phase, Solid WFL   Pharyngeal Phase, Solid throat clearing;wet vocal quality after swallow   Successful Strategies Trialed During Procedure, Solid other (see comments)  (Thin liquid wash)   Diagnostic Statement Immediate throat clearing in 3/3 trials, with voice quality change in 1/3 trials.   Swallow Compensations   Swallow Compensations Reduce amounts   Educational Assessment   Barriers to Learning No barriers   Preferred Learning Style Listening;Reading;Demonstration;Pictures/video   Esophageal Phase of Swallow   Patient reports or presents with symptoms of esophageal dysphagia No   General Therapy Interventions   Planned Therapy Interventions Dysphagia Treatment   Dysphagia treatment Instruction of safe swallow strategies   Swallow Eval: Clinical Impressions   Skilled Criteria for Therapy Intervention Skilled criteria met.  Treatment indicated.   Functional Assessment Scale (FAS) 5   Dysphagia Outcome Severity Scale (LOUISA) Level 5 - LOUISA   Treatment Diagnosis Mild oropharyngeal dysphagia   Diet texture recommendations Regular diet;Thin liquids  (Soft regular diet, with added moisture as needed)   Recommended Feeding/Eating  Techniques alternate between small bites and sips of food/liquid;maintain upright posture during/after eating for 30 mins;small sips/bites;other (see comments)  (Chew thoroughly )   Rehab Potential good, to achieve stated therapy goals   Therapy Frequency (TBD following VFSS)   Predicted Duration of Therapy Intervention (days/wks) TBD following VFSS   Risks and Benefits of Treatment have been explained. Yes   Patient, family and/or staff in agreement with Plan of Care Yes   Clinical Impression Comments Ms. Dudley presents with mild oropharyngeal dysphagia with the following s/sxs of penetration/aspiration on clinical exam with all textures assessed: immediate and delayed throat clearing that is sometimes productive, changes in voice quality following the swallow, and multiple swallows.  While patient reports choking episodes, this was not observed during today's evaluation.  Dysphagia is in the presence of mild weakness and incoordination of the swallowing musculature and subsequent prolonged mastication, minimal oral residue, and reduced/delayed hyolaryngeal excursion.  RECOMMEND: 1. VFSS in order to r/o aspiration/penetration.  Since pt was throat clearing throughout the session, it is difficult to determine how much of the throat clearing may be indicating aspiration/penetration and/or pharyngeal residue.  2. Pt to maintain a regular soft diet with thin liquids with the safe swallowing strategies noted above.  3.  Pt may be a candidate for oropharyngeal strengthening exercise training, which will be determined by VFSS.  SLP provided verbal and written education regarding findings and recommendations, and pt and daughter verbalized understanding.   Swallow Goals   SLP Swallow Goals 1;2   Swallow Goal 1   Goal Identifier Education   Goal Description Patient and family will verbalize understanding of clinical swallow evaluation findings and recommendations, as well as safe swallow techniques to use during PO intake  at home.   Target Date 01/19/18   Date Met 01/19/18   Swallow Goal 2   Goal Identifier Instrumental Evaluation   Goal Description Patient will participate in a videofluoroscopic evaluation of swallowing (VFSS) in order to assess integrity of the oropharyngeal swallow, r/o aspiration/penetration, and refine recommenations.   Target Date 03/20/18   Total Session Time   Total Session Time 45   Total Evaluation Time 45   SLP Medicare Only G-code   G-code Swallowing   Swallowing   Swallowing:  Current Status , Goal , Discharge -Ekae Only-Modifier the same for all G-codes CJ: 20-39% impairment   Swallowing Comments Based on PO trials, patient/family report     Thank you for the referral of this patient.    Allison Alpers, B.A. (music), M.A., CCC-SLP  Speech-Language Pathologist  Certificate of Vocology  [facility]  [phone]

## 2018-02-08 ENCOUNTER — OFFICE VISIT (OUTPATIENT)
Dept: FAMILY MEDICINE | Facility: CLINIC | Age: 83
End: 2018-02-08
Payer: COMMERCIAL

## 2018-02-08 VITALS
HEART RATE: 107 BPM | DIASTOLIC BLOOD PRESSURE: 72 MMHG | SYSTOLIC BLOOD PRESSURE: 121 MMHG | HEIGHT: 61 IN | TEMPERATURE: 97.3 F | BODY MASS INDEX: 23.98 KG/M2 | OXYGEN SATURATION: 93 % | WEIGHT: 127 LBS

## 2018-02-08 DIAGNOSIS — E11.9 TYPE 2 DIABETES MELLITUS WITHOUT COMPLICATION, WITHOUT LONG-TERM CURRENT USE OF INSULIN (H): Primary | ICD-10-CM

## 2018-02-08 PROCEDURE — 99213 OFFICE O/P EST LOW 20 MIN: CPT | Performed by: FAMILY MEDICINE

## 2018-02-08 ASSESSMENT — PAIN SCALES - GENERAL: PAINLEVEL: NO PAIN (0)

## 2018-02-08 NOTE — PATIENT INSTRUCTIONS
At [facility], we strive to deliver an exceptional experience to you, every time we see you.  If you receive a survey in the mail, please send us back your thoughts. We really do value your feedback.    Based on your medical history, these are the current health maintenance/preventive care services that you are due for (some may have been done at this visit.)  Health Maintenance Due   Topic Date Due     PHQ-9 Q6 MONTHS  01/14/2018         Suggested websites for health information:  Www.Thermedical.org : Up to date and easily searchable information on multiple topics.  Www.STYLIGHT.gov : medication info, interactive tutorials, watch real surgeries online  Www.familydoctor.org : good info from the Academy of Family Physicians  Www.cdc.gov : public health info, travel advisories, epidemics (H1N1)  Www.aap.org : children's health info, normal development, vaccinations  Www.health.[facility].[place].us : [place] dept of health, public health issues in [place], N1N1    Your care team:                            Family Medicine Internal Medicine   MD Luis Alberto Herbert MD Shantel Branch-Fleming, MD Katya Georgiev PA-C Megan Hill, APRN CNP    Pete Wilson MD Pediatrics   Abhinav Saucedo, PAPayalC  Chel Wren, CNP Rochelle Pineda APRN CNP   MD Destiny Reinoso MD Deborah Mielke, MD Kim Thein, APRN [facility]      Clinic hours: Monday - Thursday 7 am-7 pm; Fridays 7 am-5 pm.   Urgent care: Monday - Friday 11 am-9 pm; Saturday and Sunday 9 am-5 pm.  Pharmacy : Monday -Thursday 8 am-8 pm; Friday 8 am-6 pm; Saturday and Sunday 9 am-5 pm.     Clinic: [phone]   Pharmacy: [phone]      Understanding Type 2 Diabetes  When your body is working normally, the food you eat is digested and used as fuel. This fuel supplies energy to the body s cells. When you have diabetes, the fuel can t enter the cells. Without treatment, diabetes can cause serious long-term health problems.     Your body breaks down  the food you eat into glucose.   How the body gets energy  The digestive system breaks down food, resulting in a sugar called glucose. Some of this glucose is stored in the liver. But most of it enters the bloodstream and travels to the cells to be used as fuel. Glucose needs the help of a hormone called insulin to enter the cells. Insulin is made in the pancreas. It is released into the bloodstream in response to the presence of glucose in the blood. Think of insulin as a key. When insulin reaches a cell, it attaches to the cell wall. This signals the cell to create an opening that allows glucose to enter the cell.      When you have type 2 diabetes  Early in type 2 diabetes, your cells don t respond properly to insulin. Because of this, less glucose than normal moves into cells. This is called insulin resistance. In response, the pancreas makes more insulin. But eventually, the pancreas can t produce enough insulin to overcome insulin resistance. As less and less glucose enters cells, it builds up to a harmful level in the bloodstream. This is known as high blood sugar or hyperglycemia. The result is type 2 diabetes. The cells become starved for energy, which can leave you feeling tired and rundown.  Why high blood sugar is a problem  If high blood sugar is not controlled, blood vessels throughout the body become damaged. Prolonged high blood sugar affects organs, blood vessels, and nerves. As a result, the risks of damage to the heart, kidneys, eyes, and limbs increase. Diabetes also makes other problems, such as high blood pressure and high cholesterol, more dangerous. Over time, people with uncontrolled high blood sugar have an increase in risk of dying of, or being disabled by, heart attack or stroke.  Date Last Reviewed: 7/1/2016 2000-2017 [employer]. [address] [place] [id number]. All rights reserved. This information is not intended as a substitute for professional medical  care. Always follow your healthcare professional's instructions.

## 2018-02-08 NOTE — MR AVS SNAPSHOT
After Visit Summary   2/8/2018    Leela Dudley    MRN: [id number]           Patient Information     Date Of Birth          9/14/1926        Visit Information        Provider Department      2/8/2018 12:00 PM Ronnie Arora MD [facility]        Today's Diagnoses     Type 2 diabetes mellitus without complication, without long-term current use of insulin (H)    -  1      Care Instructions    At [facility], we strive to deliver an exceptional experience to you, every time we see you.  If you receive a survey in the mail, please send us back your thoughts. We really do value your feedback.    Based on your medical history, these are the current health maintenance/preventive care services that you are due for (some may have been done at this visit.)  Health Maintenance Due   Topic Date Due     PHQ-9 Q6 MONTHS  01/14/2018         Suggested websites for health information:  Www.Astonish Results : Up to date and easily searchable information on multiple topics.  Www.medlineplus.gov : medication info, interactive tutorials, watch real surgeries online  Www.familydoctor.org : good info from the Academy of Family Physicians  Www.cdc.gov : public health info, travel advisories, epidemics (H1N1)  Www.aap.org : children's health info, normal development, vaccinations  Www.health.state.[place].us : [place] dept of health, public health issues in [place], N1N1    Your care team:                            Family Medicine Internal Medicine   MD Luis Alberto Herbert MD Shantel Branch-Fleming, MD Katya Georgiev PA-C Megan Hill, ADAM Wilson MD Pediatrics   TEA Mercado, TORIN Pineda APRN CNP   MD Destiny Reinoso MD Deborah Mielke, MD Kim Thein, APRN CNP      Clinic hours: Monday - Thursday 7 am-7 pm; Fridays 7 am-5 pm.   Urgent care: Monday - Friday 11 am-9 pm; Saturday and Sunday 9 am-5 pm.  Pharmacy : Monday -Thursday  8 am-8 pm; Friday 8 am-6 pm; Saturday and Sunday 9 am-5 pm.     Clinic: [phone]   Pharmacy: [phone]      Understanding Type 2 Diabetes  When your body is working normally, the food you eat is digested and used as fuel. This fuel supplies energy to the body s cells. When you have diabetes, the fuel can t enter the cells. Without treatment, diabetes can cause serious long-term health problems.     Your body breaks down the food you eat into glucose.   How the body gets energy  The digestive system breaks down food, resulting in a sugar called glucose. Some of this glucose is stored in the liver. But most of it enters the bloodstream and travels to the cells to be used as fuel. Glucose needs the help of a hormone called insulin to enter the cells. Insulin is made in the pancreas. It is released into the bloodstream in response to the presence of glucose in the blood. Think of insulin as a key. When insulin reaches a cell, it attaches to the cell wall. This signals the cell to create an opening that allows glucose to enter the cell.      When you have type 2 diabetes  Early in type 2 diabetes, your cells don t respond properly to insulin. Because of this, less glucose than normal moves into cells. This is called insulin resistance. In response, the pancreas makes more insulin. But eventually, the pancreas can t produce enough insulin to overcome insulin resistance. As less and less glucose enters cells, it builds up to a harmful level in the bloodstream. This is known as high blood sugar or hyperglycemia. The result is type 2 diabetes. The cells become starved for energy, which can leave you feeling tired and rundown.  Why high blood sugar is a problem  If high blood sugar is not controlled, blood vessels throughout the body become damaged. Prolonged high blood sugar affects organs, blood vessels, and nerves. As a result, the risks of damage to the heart, kidneys, eyes, and limbs increase. Diabetes also  makes other problems, such as high blood pressure and high cholesterol, more dangerous. Over time, people with uncontrolled high blood sugar have an increase in risk of dying of, or being disabled by, heart attack or stroke.  Date Last Reviewed: 7/1/2016 2000-2017 [employer]. [address] [place] [address]. All rights reserved. This information is not intended as a substitute for professional medical care. Always follow your healthcare professional's instructions.                Follow-ups after your visit        Additional Services     DIABETES EDUCATOR REFERRAL       DIABETES SELF MANAGEMENT TRAINING (DSMT)      Your provider has referred you to Diabetes Education: FMG: Diabetes Education - [facility] [phone]   [email or web address]     If an urgent visit is needed or A1C is above 12, Care Team to call the Diabetes  Education Team at [phone] or send an In Basket message to the Diabetes Education Pool (P DIAB ED-PATIENT CARE).    A  will call you to make your appointment. If it has been more than 3 business days since your referral was placed, please call the above phone number to schedule.    Type of training and number of hours: New Diagnosis: Initial group DSMT - 10 hours.      Medicare covers: 10 hours of initial DSMT in 12 month period from the time of first visit, plus 2 hours of follow-up DSMT annually, and additional hours as requested for insulin training.    Diabetes Type: Type 2 - Diet Control, considering oral            Diabetes Co-Morbidities: dyslipidemia and hypertension               A1C Goal:  <8.0       A1C is: Lab Results       Component                Value               Date                       A1C                      6.8                 01/09/2018              Diabetes Education Topics: Comprehensive Knowledge Assessment and Instruction    Special Educational Needs Requiring Individual  DSMT: advanced age, some swallowing difficulties      MEDICAL NUTRITION THERAPY (MNT) for Diabetes    Medical Nutrition Therapy with a Registered Dietitian can be provided in coordination with Diabetes Self-Management Training to assist in achieving optimal diabetes management.    MNT Type and Hours: New diagnosis: Initial MNT - 3 hours                       Medicare will cover: 3 hours initial MNT in 12 month period after first visit, plus 2 hours of follow-up MNT annually    Please be aware that coverage of these services is subject to the terms and limitations of your health insurance plan.  Call member services at your health plan to determine Diabetes Self-Management Training (Codes  &amp; ) and Medical Nutrition Therapy (Codes 37184 & 77529) benefits and ask which blood glucose monitor brands are covered by your plan.  Please bring the following with you to your appointment:    (1)  List of current medications   (2)  List of Blood Glucose Monitor brands that are covered by your insurance plan  (3)  Blood Glucose Monitor and log book  (4)   Food records for the 3 days prior to your visit    The Certified Diabetes Educator may make diabetes medication adjustments per the CDE Protocol and Collaborative Practice Agreement.                  Follow-up notes from your care team     Return in about 5 months (around 7/9/2018) for diabetes, cholesterol, blood pressure check.      Your next 10 appointments already scheduled     Mar 12, 2018  2:00 PM CDT   XR VIDEO SPEECH EVALUATION WITH ESOPHAGRAM with UCXR2, UC GIGU [facility] ([facility])    [address] [address] [phone]           Please bring a list of your current medicines to your exam. (Include vitamins, minerals and over-the-counter medicines.) Leave your valuables at home.  Tell the doctor if there is a chance you could be pregnant.  Do not eat for 4 hours  before the exam. Keep drinking clear liquids until 2 hours before the exam.  You may take pain medicine (with a sip of water) up to 4 hours before the exam.  Do not swallow any other medicines unless your doctor tells you to. Talk to your doctor to be sure it s safe to stop your medicines.  Please call the Imaging Department at your exam site with any questions.            Mar 12, 2018  2:00 PM CDT   Video Swallow with MIKAELA Leonardo   [facility]    [address] [facility]  4th Floor  [facility] [address] [phone]           Please check in for this visit in Imaging on the 1st floor.              Who to contact     If you have questions or need follow up information about today's clinic visit or your schedule please contact [facility] directly at [phone].  Normal or non-critical lab and imaging results will be communicated to you by MyChart, letter or phone within 4 business days after the clinic has received the results. If you do not hear from us within 7 days, please contact the clinic through [facility] or phone. If you have a critical or abnormal lab result, we will notify you by phone as soon as possible.  Submit refill requests through [employer] or call your pharmacy and they will forward the refill request to us. Please allow 3 business days for your refill to be completed.          Additional Information About Your Visit        MyChart Information     [employer] gives you secure access to your electronic health record. If you see a primary care provider, you can also send messages to your care team and make appointments. If you have questions, please call your primary care clinic.  If you do not have a primary care provider, please call [phone] and they will assist you.        Care EveryWhere ID     This is your Care EveryWhere ID. This could be used by other organizations to access your [place] medical records  [id number]       "  Your Vitals Were     Pulse Temperature Height Pulse Oximetry BMI (Body Mass Index)       107 97.3  F (36.3  C) (Oral) 5' 1\" (1.549 m) 93% 24 kg/m2        Blood Pressure from Last 3 Encounters:   02/08/18 121/72   01/09/18 110/48   09/14/17 134/62    Weight from Last 3 Encounters:   02/08/18 127 lb (57.6 kg)   01/09/18 133 lb (60.3 kg)   09/14/17 133 lb (60.3 kg)              We Performed the Following     DIABETES EDUCATOR REFERRAL          Today's Medication Changes          These changes are accurate as of 2/8/18 12:19 PM.  If you have any questions, ask your nurse or doctor.               Start taking these medicines.        Dose/Directions    order for DME   Used for:  Type 2 diabetes mellitus without complication, without long-term current use of insulin (H)   Started by:  Ronnie Arora MD        Glucose meter (model per insurance preference) with associated test solution, lancets (#100 w/ prn refills), and test strips (#100 w/ prn refills). Check glucose 1 time(s) per day, or as directed.   Quantity:  1 Device   Refills:  0       STATIN NOT PRESCRIBED (INTENTIONAL)   Used for:  Type 2 diabetes mellitus without complication, without long-term current use of insulin (H)   Started by:  Ronnie Arora MD        Intolerance to atorvastatin.   Refills:  0            Where to get your medicines      Some of these will need a paper prescription and others can be bought over the counter.  Ask your nurse if you have questions.     Bring a paper prescription for each of these medications     order for DME       You don't need a prescription for these medications     STATIN NOT PRESCRIBED (INTENTIONAL)                Primary Care Provider Office Phone # Fax #    Ronnie Arora [phone]       [address]  [facility] [address]        Equal Access to Services     JULISA PATEL AH: Theron daughertyo Soomaali, waaxda luqadaha, qaybta kaalmada adezachery, andreina tee " la'jimn liss. So [facility] [phone].    ATENCIÓN: Si habla mary ellen, tiene a arzola disposición servicios gratuitos de asistencia lingüística. Marianna al [phone].    We comply with applicable federal civil rights laws and [place] laws. We do not discriminate on the basis of race, color, national origin, age, disability, sex, sexual orientation, or gender identity.            Thank you!     Thank you for choosing [facility]  for your care. Our goal is always to provide you with excellent care. Hearing back from our patients is one way we can continue to improve our services. Please take a few minutes to complete the written survey that you may receive in the mail after your visit with us. Thank you!             Your Updated Medication List - Protect others around you: Learn how to safely use, store and throw away your medicines at www.disposemymeds.org.          This list is accurate as of 2/8/18 12:19 PM.  Always use your most recent med list.                   Brand Name Dispense Instructions for use Diagnosis    aspirin 81 MG tablet      1 TABLET DAILY        candesartan cilexetil 32 MG Tabs     90 tablet    Take 1 tablet by mouth daily for blood pressure.    Essential hypertension with goal blood pressure less than 140/90       cloNIDine 0.1 MG/24HR WK patch    CATAPRES-TTS1    13 patch    Place 1 patch onto the skin once a week for blood pressure.    Essential hypertension with goal blood pressure less than 140/90       diltiazem 360 MG 24 hr CD capsule    CARDIZEM CD; CARTIA XT    90 capsule    Take 1 capsule (360 mg) by mouth every evening for blood pressure.    Essential hypertension with goal blood pressure less than 140/90       hydrochlorothiazide 25 MG tablet    HYDRODIURIL    90 tablet    Take 1 tablet (25 mg) by mouth daily for blood pressure.    Essential hypertension with goal blood pressure less than 140/90       order for DME     1 Device    Glucose meter (model per insurance  preference) with associated test solution, lancets (#100 w/ prn refills), and test strips (#100 w/ prn refills). Check glucose 1 time(s) per day, or as directed.    Type 2 diabetes mellitus without complication, without long-term current use of insulin (H)       STATIN NOT PRESCRIBED (INTENTIONAL)      Intolerance to atorvastatin.    Type 2 diabetes mellitus without complication, without long-term current use of insulin (H)

## 2018-02-08 NOTE — PROGRESS NOTES
"  SUBJECTIVE:   Leela Dudley is a 89 year old female who presents to clinic today for the following health issues:  She is accompanied by her daughter.     Consult new Dx: Diabetes based on past labs. Denies family hx of diabetes. Daughter notes the patient likes sweets, but daughter recently bought a lot of sugar free food/drink items after they received the lab results. Patient also drinks a glucose-control Boost frequently. Additionally, daughter notes patient's continued difficulty with swallowing, which is particularly a problem with larger pills in her regimen. She has a swallow study scheduled soon.    Past medical, family, and social histories, medications, and allergies are reviewed and updated in Epic.     ROS:  C: NEGATIVE for fever, chills, change in weight  E/M: NEGATIVE for ear, mouth and throat problems  R: NEGATIVE for significant cough or SOB  CV: NEGATIVE for chest pain, palpitations or peripheral edema  ROS otherwise negative    This document serves as a record of the services and decisions personally performed and made by Dr. Arora. It was created on his behalf by Melvi Jordan, a trained medical scribe. The creation of this document is based the provider's statements to the medical scribe.  Melvi Jordan February 8, 2018 12:07 PM     OBJECTIVE:                                                    /72  Pulse 107  Temp 97.3  F (36.3  C) (Oral)  Ht 1.549 m (5' 1\")  Wt 57.6 kg (127 lb)  SpO2 93%  BMI 24 kg/m2   Body mass index is 24 kg/(m^2).     GENERAL: healthy, alert and no distress  EYES: Eyes grossly normal to inspection, PERRL, EOMI, sclerae white and conjunctivae normal  MS: no gross musculoskeletal defects noted, no edema  SKIN: no suspicious lesions or rashes  NEURO: Normal strength and tone, sensory exam grossly normal, mentation intact, oriented times 3 and cranial nerves 2-12 intact  PSYCH: mentation appears normal, affect normal/bright     Diagnostic Test " Results:  Results for orders placed or performed in visit on 01/09/18   BASIC METABOLIC PANEL   Result Value Ref Range    Sodium 140 133 - 144 mmol/L    Potassium 3.5 3.4 - 5.3 mmol/L    Chloride 104 94 - 109 mmol/L    Carbon Dioxide 25 20 - 32 mmol/L    Anion Gap 11 3 - 14 mmol/L    Glucose 193 (H) 70 - 99 mg/dL    Urea Nitrogen 34 (H) 7 - 30 mg/dL    Creatinine 0.82 0.52 - 1.04 mg/dL    GFR Estimate 65 >60 mL/min/1.7m2    GFR Estimate If Black 79 >60 mL/min/1.7m2    Calcium 9.4 8.5 - 10.1 mg/dL   Albumin Random Urine Quantitative with Creat Ratio   Result Value Ref Range    Creatinine Urine 173 mg/dL    Albumin Urine mg/L 11 mg/L    Albumin Urine mg/g Cr 6.53 0 - 25 mg/g Cr   Hemoglobin A1c   Result Value Ref Range    Hemoglobin A1C 6.8 (H) 4.3 - 6.0 %     Lab Results   Component Value Date    A1C 6.8 01/09/2018    A1C 6.5 07/14/2017        ASSESSMENT/PLAN:                                                      (E11.9) Type 2 diabetes mellitus without complication, without long-term current use of insulin (H)  (primary encounter diagnosis)  Comment: New diagnosis. Her HgbA1c is low enough that she may do well diet-controlled.   Plan: DIABETES EDUCATOR REFERRAL, order for DME,         STATIN NOT PRESCRIBED, INTENTIONAL,        Handouts provided. Return in about 5 months (around 7/9/2018) for diabetes, cholesterol, blood pressure check.       The information in this document, created by the medical scribe for me, accurately reflects the services I personally performed and the decisions made by me. I have reviewed and approved this document for accuracy prior to leaving the patient care area. February 8, 2018 12:07 PM   Ronnie Arora MD

## 2018-02-12 ENCOUNTER — TELEPHONE (OUTPATIENT)
Dept: FAMILY MEDICINE | Facility: CLINIC | Age: 83
End: 2018-02-12

## 2018-02-12 DIAGNOSIS — E11.9 TYPE 2 DIABETES MELLITUS WITHOUT COMPLICATION, WITHOUT LONG-TERM CURRENT USE OF INSULIN (H): Primary | ICD-10-CM

## 2018-02-12 NOTE — TELEPHONE ENCOUNTER
..Reason for Call:  PRESCRIPTION    Detailed comments: [facility] called said there was multiple scripts on one form. The patient has MA and it need to be separate scripts to fill. Also please indicate the diagnosis codes.    Phone Number Patient can be reached at: [phone]    Best Time: anytime    Can we leave a detailed message on this number? Not Applicable    Call taken on 2/12/2018 at 3:00 PM by Delia Soto

## 2018-02-13 NOTE — TELEPHONE ENCOUNTER
"Called and spoke with pharmacist to clarify message below. DME order sent on 2/8/18 by Dr. Arora for glucose meter, test strips, and lancets can not be used. Pt has MA and they will not accept this prescription. All diabetic supplies must be ordered individually, include a diagnosis code, be specific with instructions, include the # of how many are wanting, and can not say \"refill prn\", must specify # of refills given, and must include \"use (#) times daily\", and not \"test as directed\". It is ok for prescription to be generic and include \"per insurance preference\".These are MA requirements and they will not pay for prescriptions if not given this way.  Writer advised that Rx's will be re-done to include requirements and will be sent again today. Pharmacist agreed.    Prescriptions re-ordered as requested, printed and signed by provider. Rx's handed to TC to fax to pharmacy.    Beronica Brewer RN  [facility] Triage    "

## 2018-02-21 ENCOUNTER — TRANSFERRED RECORDS (OUTPATIENT)
Dept: HEALTH INFORMATION MANAGEMENT | Facility: CLINIC | Age: 83
End: 2018-02-21

## 2018-03-21 ENCOUNTER — TELEPHONE (OUTPATIENT)
Dept: FAMILY MEDICINE | Facility: CLINIC | Age: 83
End: 2018-03-21

## 2018-03-21 NOTE — TELEPHONE ENCOUNTER
Please call to discuss patient and daughter not following dr recommendations    Ok to leave ,=message  [facility] Adult [facility]/ Alicia (Other) [phone]           Ok for nurse to call also    Ok to leave message

## 2018-03-21 NOTE — TELEPHONE ENCOUNTER
Alicia with [facility] returned call-    States that patient and daughter not following medical recommendations. Not following Pureed Diet. Declined meals on wheels. They were suppose to find a home care agency ( due to patient's scoring- need 24 hours supervision) but they have not found an agency yet. Alicia states patient does not want people going in and out her home. Patient also need help setting up medications.  Alicia is wondering if this is something that it is ok to let go, or should they investigate into this? Wondering what provider's recommendations are?  Ok to leave detailed message for Alicia.    Nyasia Dexter MA

## 2018-03-21 NOTE — TELEPHONE ENCOUNTER
This writer attempted to contact [facility] on 03/21/18    Reason for call ( see below) rtn call and left message.    If patient calls back:   Patient contacted by 1st floor [facility] Care Team (MA/TC). Inform patient that someone from the team will contact them, document that pt called and route to care team.     Nyasia Dexter MA

## 2018-03-23 ENCOUNTER — PATIENT OUTREACH (OUTPATIENT)
Dept: CARE COORDINATION | Facility: CLINIC | Age: 83
End: 2018-03-23

## 2018-03-23 NOTE — LETTER
[facility] CARE COORDINATION    March 23, 2018    Leela Stoutotar  [address]  [facility] [address]      Dear Leela,    I am a clinic care coordinator who works with Ronnie Arora MD at [facility]. I wanted to thank you for spending the time to talk with me.  I wanted to introduce myself and provide you with my contact information so that you can call me with questions or concerns about your health care. Below is a description of clinic care coordination and how I can further assist you.     The clinic care coordinator is a registered nurse and/or  who understand the health care system. The goal of clinic care coordination is to help you manage your health and improve access to the [facility] system in the most efficient manner. The registered nurse can assist you in meeting your health care goals by providing education, coordinating services, and strengthening the communication among your providers. The  can assist you with financial, behavioral, psychosocial, chemical dependency, counseling, and/or psychiatric resources.    Please feel free to contact me at [phone], with any questions or concerns. We at [facility] are focused on providing you with the highest-quality healthcare experience possible and that all starts with you.     Sincerely,     Rigo Oliveira, MSN, RN, PHN I Clinic Care Coordinator  [facility]  Phone: [phone]  [email or web address]      Enclosed: I have enclosed a copy of a 24 Hour Access Plan. This has helpful phone numbers for you to call when needed. Please keep this in an easy to access place to use as needed.

## 2018-03-23 NOTE — PROGRESS NOTES
Clinic Care Coordination Contact    OUTREACH    Referral Information:  Referral Source: SNF/TCU    Primary Diagnosis: Injury/Fall    Chief Complaint   Patient presents with     Clinic Care Coordination - Initial     RN CC (TCU discharge)     Care Team     Chart Review Please        Universal Utilization: appropriate use of resources  Utilization    Last refreshed: 3/23/2018  2:53 PM:  No Show Count (past year) 0       Last refreshed: 3/23/2018  2:53 PM:  ED visits 0       Last refreshed: 3/23/2018  2:53 PM:  Hospital admissions 0          Current as of: 3/23/2018  2:53 PM             Clinical Concerns:  The patient states that she was at the TCU due to falling and the need for PT and strengthening.  Current Medical Concerns:    Type 2 diabetes mellitus without complication, without long-term current use of insulin (H)   Decubitus ulcer of coccygeal region, stage 2   Major depressive disorder, single episode, mild (H)   Pseudophakia OU, with Yag OS   Choroidal nevus OD   Fatigue   Vitamin D deficiency disease   Hyperlipidemia LDL goal <130   Health Care Home   Advance directive discussed with patient   History of acute renal failure   Diverticulitis of colon   Advanced directives, counseling/discussion   Essential hypertension with goal blood pressure less than 140/90   Osteopenia   OA (osteoarthritis) of knee   Gastroesophageal reflux disease without esophagitis         Current Behavioral Concerns: depression    Education Provided to patient: care coordination   Clinical Pathway Name: None  Health Maintenance Reviewed: Due/Overdue     Medication Management:  Patient is knowledgeable on medications and is adherent.  No financial concerns reported at this time.       Functional Status:  Mobility Status: Independent w/Device  Equipment Currently Used at Home: cane, straight, walker, standard, grab bar, shower chair, raised toilet  Transportation:  Transportation means:: Family     Psychosocial:  Current living  arrangement:: I live alone  Financial/Insurance: [facility] [employer]       Resources and Interventions:  Current Resources:  ;    Advanced Care Plans/Directives on file:: No     Patient/Caregiver understanding: The patient has some understanding of the importance of continued exercising and moving about in her home.  Outreach Frequency: weekly       Plan: 1).  The patient will continue to move and perform the exercises that she was given from PT at the TCU.  2).  The patient will follow the pureed diet as referenced on 3-21-18.  3).  The patient will take all medications as prescribed from the providers.  Per the patient she takes them herself and her daughter does not assist with set up.  4).  The Care Coordination RN will make a follow up call to the patient again in 1-2 weeks.  5).  Care Coordination to remain available for the patient to contact in the event of future needs.      Rigo Oliveira, MSN, RN, PHN I Clinic Care Coordinator  [facility]  Phone: [phone]  [email or web address]

## 2018-03-23 NOTE — LETTER
Health Care Home - Access Care Plan    About Me  Patient Name:  Leela Francisco    YOB: 1926  Age:                             89 year old   Stephan MRN:            [id number] Telephone Information:     Home Phone [phone]   Mobile [phone]       Address:    [address]  [facility] [address] Email address:  [email or web address]      Emergency Contact(s)  Name Relationship Lgl Grd Work Phone Home Phone Mobile Phone   1. LUIGI FRANCISCO Daughter    [phone]   2. NO SECONDARY C*    none              Health Maintenance: Routine Health maintenance Reviewed: Due/Overdue     My Access Plan  Medical Emergency 911   Questions or concerns during clinic hours Primary Clinic Line, I will call the clinic directly: [facility] [phone]   24 Hour Appointment Line [phone] or  [phone] [place] ([phone]) (toll free)   24 Hour Nurse Line [phone] (toll free)   Questions or concerns outside clinic hours 24 Hour Appointment Line, I will call the after-hours on-call line:   [facility] [phone] or [phone]) (toll-free)   Preferred Urgent Care     [facility]  [phone]   Preferred Pharmacy [facility]/pharmacy [id number] - [facility] [address] [place] [address].     Behavioral Health Crisis Line The National Suicide Prevention Lifeline at 1-159.410.8249 or 911     My Care Team Members  Patient Care Team       Relationship Specialty Notifications Start End    Ronnie Arora MD PCP - General Family Practice  11/28/16     Phone: [phone] Fax: [phone]         [address] [facility] [address]    Rigo Alba, RIAN Clinic Care Coordinator Primary Care - CC Admissions 3/23/18     Phone: [phone] Fax: [phone]            My Medical and Care Information  Problem List   Patient Active Problem List   Diagnosis     Osteopenia     OA (osteoarthritis) of knee      Gastroesophageal reflux disease without esophagitis     Essential hypertension with goal blood pressure less than 140/90     Advanced directives, counseling/discussion     Health Care Home     Vitamin D deficiency disease     Hyperlipidemia LDL goal <130     Fatigue     Choroidal nevus OD      Pseudophakia OU, with Yag OS     Major depressive disorder, single episode, mild (H)     Decubitus ulcer of coccygeal region, stage 2     Advance directive discussed with patient     History of acute renal failure     Diverticulitis of colon     Type 2 diabetes mellitus without complication, without long-term current use of insulin (H)

## 2018-04-05 NOTE — TELEPHONE ENCOUNTER
Spoke with Alicia with [facility], with adult Protection. She said it has been over 5 days since from the time she contacted the clinic. Then case become a lesser priority. Therefore Alicia assisgned the case to Mouna Rodrigues [phone].     This writer attempted to contact Mouna Rodrigues on 04/05/18    Reason for call patient safety issue and left detailed message.    If patient calls back:   Relay the above message, (read verbatim), document that pt called and close encounter      Pearl Hoang, CMA

## 2018-04-05 NOTE — TELEPHONE ENCOUNTER
It might be worth ooking into, there may be a home safety issue.  Otherwise nothing medical to do if patient refuses to do anything recommended to them.

## 2018-04-06 NOTE — TELEPHONE ENCOUNTER
Called and spoke with Mouna. She states she had already made a visit to patient's home last week. Thinks patient is doing ok, but might benefit from a home aide once or twice a week., wondering if she had a cognitive screen done recently, informed Mouna last cognitive was a year ago. She states she will talk to daughter and go from there.     Nyasia Dexter MA

## 2018-05-31 ENCOUNTER — TRANSFERRED RECORDS (OUTPATIENT)
Dept: HEALTH INFORMATION MANAGEMENT | Facility: CLINIC | Age: 83
End: 2018-05-31

## 2018-06-22 ENCOUNTER — OFFICE VISIT (OUTPATIENT)
Dept: FAMILY MEDICINE | Facility: CLINIC | Age: 83
End: 2018-06-22
Payer: COMMERCIAL

## 2018-06-22 VITALS
HEIGHT: 61 IN | HEART RATE: 64 BPM | TEMPERATURE: 98.4 F | OXYGEN SATURATION: 96 % | SYSTOLIC BLOOD PRESSURE: 138 MMHG | DIASTOLIC BLOOD PRESSURE: 61 MMHG

## 2018-06-22 DIAGNOSIS — I10 ESSENTIAL HYPERTENSION WITH GOAL BLOOD PRESSURE LESS THAN 140/90: ICD-10-CM

## 2018-06-22 DIAGNOSIS — E11.41 TYPE 2 DIABETES MELLITUS WITH DIABETIC MONONEUROPATHY, WITHOUT LONG-TERM CURRENT USE OF INSULIN (H): Primary | ICD-10-CM

## 2018-06-22 DIAGNOSIS — E78.5 HYPERLIPIDEMIA LDL GOAL <100: ICD-10-CM

## 2018-06-22 LAB
ALT SERPL W P-5'-P-CCNC: 13 U/L (ref 0–50)
ANION GAP SERPL CALCULATED.3IONS-SCNC: 11 MMOL/L (ref 3–14)
BUN SERPL-MCNC: 20 MG/DL (ref 7–30)
CALCIUM SERPL-MCNC: 9.2 MG/DL (ref 8.5–10.1)
CHLORIDE SERPL-SCNC: 108 MMOL/L (ref 94–109)
CHOLEST SERPL-MCNC: 231 MG/DL
CO2 SERPL-SCNC: 24 MMOL/L (ref 20–32)
CREAT SERPL-MCNC: 0.76 MG/DL (ref 0.52–1.04)
GFR SERPL CREATININE-BSD FRML MDRD: 72 ML/MIN/1.7M2
GLUCOSE SERPL-MCNC: 119 MG/DL (ref 70–99)
HBA1C MFR BLD: 6.9 % (ref 0–5.6)
HDLC SERPL-MCNC: 42 MG/DL
LDLC SERPL CALC-MCNC: 165 MG/DL
LDLC SERPL DIRECT ASSAY-MCNC: 165 MG/DL
NONHDLC SERPL-MCNC: 189 MG/DL
POTASSIUM SERPL-SCNC: 4 MMOL/L (ref 3.4–5.3)
SODIUM SERPL-SCNC: 143 MMOL/L (ref 133–144)
TRIGL SERPL-MCNC: 122 MG/DL
TSH SERPL DL<=0.005 MIU/L-ACNC: 2.17 MU/L (ref 0.4–4)

## 2018-06-22 PROCEDURE — 99207 C FOOT EXAM  NO CHARGE: CPT | Performed by: FAMILY MEDICINE

## 2018-06-22 PROCEDURE — 83036 HEMOGLOBIN GLYCOSYLATED A1C: CPT | Performed by: FAMILY MEDICINE

## 2018-06-22 PROCEDURE — 80048 BASIC METABOLIC PNL TOTAL CA: CPT | Performed by: FAMILY MEDICINE

## 2018-06-22 PROCEDURE — 36415 COLL VENOUS BLD VENIPUNCTURE: CPT | Performed by: FAMILY MEDICINE

## 2018-06-22 PROCEDURE — 84443 ASSAY THYROID STIM HORMONE: CPT | Performed by: FAMILY MEDICINE

## 2018-06-22 PROCEDURE — 84460 ALANINE AMINO (ALT) (SGPT): CPT | Performed by: FAMILY MEDICINE

## 2018-06-22 PROCEDURE — 83721 ASSAY OF BLOOD LIPOPROTEIN: CPT | Mod: 59 | Performed by: FAMILY MEDICINE

## 2018-06-22 PROCEDURE — 99214 OFFICE O/P EST MOD 30 MIN: CPT | Performed by: FAMILY MEDICINE

## 2018-06-22 PROCEDURE — 80061 LIPID PANEL: CPT | Performed by: FAMILY MEDICINE

## 2018-06-22 ASSESSMENT — PAIN SCALES - GENERAL: PAINLEVEL: NO PAIN (0)

## 2018-06-22 NOTE — PROGRESS NOTES
SUBJECTIVE:   Leela Dudley is a 89 year old female who presents to clinic today for the following health issues:  She is accompanied by her daughter.     Diabetes Follow-up      Patient is checking blood sugars: not at all    Diabetic concerns: none      Symptoms of hypoglycemia (low blood sugar): none     Paresthesias (numbness or burning in feet) or sores: No     Date of last diabetic eye exam: DUE    Hyperlipidemia Follow-Up      Rate your low fat/cholesterol diet?: not monitoring fat    Taking statin?  No    Other lipid medications/supplements?:  none    Hypertension Follow-up      Outpatient blood pressures are being checked at home. Results are Low.    She has not been taking the hydrochlorothiazide because of low BP. Her daughter states that she gets almost lethargic when her BP is that low.    Low Salt Diet: no added salt    BP Readings from Last 2 Encounters:   06/22/18 138/61   02/08/18 121/72     Hemoglobin A1C (%)   Date Value   06/22/2018 6.9 (H)   01/09/2018 6.8 (H)     LDL Cholesterol Calculated (mg/dL)   Date Value   07/14/2017 158 (H)   01/11/2011 195 (H)     LDL Cholesterol Direct (mg/dL)   Date Value   06/03/2014 138 (H)   04/18/2013 149 (H)       Problem list and histories reviewed & adjusted, as indicated.  Additional history: as documented    Past medical, family, and social histories, medications, and allergies are reviewed and updated in Epic.     ROS:  CONSTITUTIONAL: NEGATIVE for fever, chills, change in weight  ENT/MOUTH: NEGATIVE for ear, mouth and throat problems  RESP: NEGATIVE for significant cough or SOB  CV: NEGATIVE for chest pain, palpitations or peripheral edema  ROS otherwise negative    This document serves as a record of the services and decisions personally performed and made by Dr. Arora. It was created on his behalf by Susan Mason, a trained medical scribe. The creation of this document is based the provider's statements to the medical scribe.  Susan Mason June 22,  "2018 2:25 PM     OBJECTIVE:                                                    /61  Pulse 64  Temp 98.4  F (36.9  C) (Oral)  Ht 1.549 m (5' 1\")  SpO2 96%   There is no height or weight on file to calculate BMI.     GENERAL: healthy, alert and no distress  EYES: Eyes grossly normal to inspection, PERRL, EOMI, sclerae white and conjunctivae normal  MS: no gross musculoskeletal defects noted, no edema  SKIN: no suspicious lesions or rashes  FEET: Sensory exam of the foot is normal except for locations noted in the sketch below, tested with the monofilament. She has very long toenails, which are curved and which have corners that may be causing or setting her up to have ulcerations on the neighboring toes. She likely has tinea pedis.  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 Still mild without medication.  Plan: FOOT EXAM  NO CHARGE [53585.114], HEMOGLOBIN         A1C, TSH WITH FREE T4 REFLEX, PODIATRY/FOOT &         ANKLE SURGERY REFERRAL, Lipid panel         reflex to direct LDL         HgbA1c graph provided. Handouts provided. Return in about 6 months (around 12/22/2018) for full physical, diabetes, blood pressure check.     (I10) Essential hypertension with goal blood pressure less than 140/90  Comment: borderline well controlled " without hydrochlorothiazide   Plan: Basic metabolic panel  (Ca, Cl, CO2, Creat,         Gluc, K, Na, BUN)        Monitor periodically. Return in about 6 months (around 12/22/2018) for full physical, diabetes, blood pressure check.     (E78.5) Hyperlipidemia LDL goal <100  Comment: non-fasting, statin not tolerated and not necessarily indicated based on Pooled Cohort Equations (AHA/ACC 2013 Blood Cholesterol Guideline).  Plan: ALT, Lipid panel reflex to direct LDL Fasting        Monitor periodically.       The information in this document, created by the medical scribe for me, accurately reflects the services I personally performed and the decisions made by me. I have reviewed and approved this document for accuracy prior to leaving the patient care area. June 22, 2018 2:25 PM     Ronnei Arora MD

## 2018-06-22 NOTE — PATIENT INSTRUCTIONS
At [facility], we strive to deliver an exceptional experience to you, every time we see you.  If you receive a survey in the mail, please send us back your thoughts. We really do value your feedback.    Based on your medical history, these are the current health maintenance/preventive care services that you are due for (some may have been done at this visit.)  Health Maintenance Due   Topic Date Due     FOOT EXAM Q1 YEAR  09/14/1927     TSH W/ FREE T4 REFLEX Q2 YEAR  10/19/2011     PHQ-9 Q6 MONTHS  01/14/2018     EYE EXAM Q1 YEAR  05/05/2018     A1C Q6 MO  07/09/2018     LIPID MONITORING Q1 YEAR  07/14/2018       Suggested websites for health information:  Www.[employer].org : Up to date and easily searchable information on multiple topics.  Www.[employer]ium.gov : medication info, interactive tutorials, watch real surgeries online  Www.familydoctor.org : good info from the Academy of Family Physicians  Www.cdc.gov : public health info, travel advisories, epidemics (H1N1)  Www.aap.org : children's health info, normal development, vaccinations  Www.health.state.[place].us : [place] dept of health, public health issues in [place], N1N1    Your care team:                            Family Medicine Internal Medicine   MD Luis Alberto Herbert MD Shantel Branch-Fleming, MD Katya Georgiev PA-C Megan Hill, ADAM Wilson MD Pediatrics   TEA Mercado, MD Rochelle Vera APRN CNP   MD Destiny Reinoso MD Deborah Mielke, MD Kim Thein, APRN [facility]      Clinic hours: Monday - Thursday 7 am-7 pm; Fridays 7 am-5 pm.   Urgent care: Monday - Friday 11 am-9 pm; Saturday and Sunday 9 am-5 pm.  Pharmacy : Monday -Thursday 8 am-8 pm; Friday 8 am-6 pm; Saturday and Sunday 9 am-5 pm.     Clinic: [phone]   Pharmacy: [phone]      Managing Diabetes: The A1C Test       Healthy red blood cells have some glucose stuck to them. A high A1C means that  unhealthy amounts of glucose are stuck to the cells.   What is the A1C test?  Using your meter helps you track your blood sugar every day. But your glucose meter tells you the value at the time of testing only. You also need to know if your treatment plan is keeping you healthy over time. The hemoglobin A1C (or glycated hemoglobin) test can help. This test measures your average blood sugar level over a few months. A higher A1C result means that you have a higher risk of developing complications.  The A1C test  The A1C is a blood test done by your healthcare provider. You will likely have an A1C test every 3 to 6 months.  Your blood glucose goal  A1C has been shown as a percentage. But it can also be shown as a number representing the estimated Average Glucose (eAG). Unlike the A1C percentage, eAG is a number similar to the numbers listed on your daily glucose monitor. Both A1C and eAG measure the amount of glucose stuck to a protein called hemoglobin in red blood cells. Your healthcare provider will help you figure out what your ideal A1C or eAG should be. Your target number will depend on your age, general health, and other factors. If your current number is too high, your treatment plan may need changes, such as different medicines.  Sample results  Most people aim for an A1c lower than 7%. That s an eAG less than 154 mg/dL. Or, your healthcare provider may want you to aim for an A1C of 6%. That s an eAG of 126 mg/dL.     Glucose calculator  Visit http://professional.diabetes.org/diapro/glucose_calc for a chart that helps convert your A1C percentages into eAG numbers.   Date Last Reviewed: 6/1/2016 2000-2017 The [employer]. [address] [place] [address], [place] [address]. All rights reserved. This information is not intended as a substitute for professional medical care. Always follow your healthcare professional's instructions.

## 2018-06-22 NOTE — MR AVS SNAPSHOT
After Visit Summary   6/22/2018    Leela Dudley    MRN: [id number]           Patient Information     Date Of Birth          9/14/1926        Visit Information        Provider Department      6/22/2018 1:40 PM Ronnie Arora MD [facility]        Today's Diagnoses     Type 2 diabetes mellitus with diabetic mononeuropathy, without long-term current use of insulin (H)    -  1    Essential hypertension with goal blood pressure less than 140/90        Hyperlipidemia LDL goal <100          Care Instructions    At [facility], we strive to deliver an exceptional experience to you, every time we see you.  If you receive a survey in the mail, please send us back your thoughts. We really do value your feedback.    Based on your medical history, these are the current health maintenance/preventive care services that you are due for (some may have been done at this visit.)  Health Maintenance Due   Topic Date Due     FOOT EXAM Q1 YEAR  09/14/1927     TSH W/ FREE T4 REFLEX Q2 YEAR  10/19/2011     PHQ-9 Q6 MONTHS  01/14/2018     EYE EXAM Q1 YEAR  05/05/2018     A1C Q6 MO  07/09/2018     LIPID MONITORING Q1 YEAR  07/14/2018       Suggested websites for health information:  Www.[employer].org : Up to date and easily searchable information on multiple topics.  Www.medlineplus.gov : medication info, interactive tutorials, watch real surgeries online  Www.familydoctor.org : good info from the Academy of Family Physicians  Www.cdc.gov : public health info, travel advisories, epidemics (H1N1)  Www.aap.org : children's health info, normal development, vaccinations  Www.health.state.[place].us : [place] dept of health, public health issues in [place], N1N1    Your care team:                            Family Medicine Internal Medicine   MD Luis Alberto Herbert MD Shantel Branch-Fleming, MD Katya Georgiev PA-C Megan Hill, APRN CNP Nam Ho, MD Pediatrics   Abhinav Saucedo,  TEA [place], MD Rochelle Vera APRN CNP   MD Destiny Reinoso MD Deborah Mielke, MD Kim Thein, APRN [facility]      Clinic hours: Monday - Thursday 7 am-7 pm; Fridays 7 am-5 pm.   Urgent care: Monday - Friday 11 am-9 pm; Saturday and Sunday 9 am-5 pm.  Pharmacy : Monday -Thursday 8 am-8 pm; Friday 8 am-6 pm; Saturday and Sunday 9 am-5 pm.     Clinic: [phone]   Pharmacy: [phone]      Managing Diabetes: The A1C Test       Healthy red blood cells have some glucose stuck to them. A high A1C means that unhealthy amounts of glucose are stuck to the cells.   What is the A1C test?  Using your meter helps you track your blood sugar every day. But your glucose meter tells you the value at the time of testing only. You also need to know if your treatment plan is keeping you healthy over time. The hemoglobin A1C (or glycated hemoglobin) test can help. This test measures your average blood sugar level over a few months. A higher A1C result means that you have a higher risk of developing complications.  The A1C test  The A1C is a blood test done by your healthcare provider. You will likely have an A1C test every 3 to 6 months.  Your blood glucose goal  A1C has been shown as a percentage. But it can also be shown as a number representing the estimated Average Glucose (eAG). Unlike the A1C percentage, eAG is a number similar to the numbers listed on your daily glucose monitor. Both A1C and eAG measure the amount of glucose stuck to a protein called hemoglobin in red blood cells. Your healthcare provider will help you figure out what your ideal A1C or eAG should be. Your target number will depend on your age, general health, and other factors. If your current number is too high, your treatment plan may need changes, such as different medicines.  Sample results  Most people aim for an A1c lower than 7%. That s an eAG less than 154 mg/dL. Or, your healthcare provider may want  you to aim for an A1C of 6%. That s an eAG of 126 mg/dL.     Glucose calculator  Visit http://professional.diabetes.org/diapro/glucose_calc for a chart that helps convert your A1C percentages into eAG numbers.   Date Last Reviewed: 6/1/2016 2000-2017 [employer]. [address] [place] [address]. All rights reserved. This information is not intended as a substitute for professional medical care. Always follow your healthcare professional's instructions.                Follow-ups after your visit        Additional Services     PODIATRY/FOOT & ANKLE SURGERY REFERRAL       Your provider has referred you to:   FMG: [facility] [phone]   [email or web address]  FMG: [facility] [phone]   [email or web address]  FMG: [facility] [place] [phone]   [email or web address]  FMG: [facility] [place] [phone]   [email or web address]  FMG: [facility] [phone]   [email or web address]  FMG: [facility] [place] [phone]   [email or web address]  FMG: [facility] [place] [facility] [place] [phone]   [email or web address]  [facility]: [facility]: [address] [place] [address] Patient Clinic Line: [phone]     Please be aware that coverage of these services is subject to the terms and limitations of your health insurance plan.  Call member services at your health plan with any benefit or coverage questions.      Please bring the following to your appointment:  >>   Any x-rays, CTs or MRIs which have been performed.  Contact the facility where they were done to arrange for  prior to your scheduled appointment.    >>   List of current medications   >>    This referral request   >>   Any documents/labs given to you for this referral                  Follow-up notes from your care team     Return in about 6 months (around 12/22/2018) for full physical, diabetes, blood pressure check.      Your next 10 appointments already scheduled     Jul 11, 2018  1:00 PM CDT   New Visit with Hi Grande MD   [facility]    [address]  [place] [id number] [phone]              Future tests that were ordered for you today     Open Future Orders        Priority Expected Expires Ordered    Lipid panel reflex to direct LDL Fasting Routine 6/22/2018 6/22/2019 6/22/2018            Who to contact     If you have questions or need follow up information about today's clinic visit or your schedule please contact [facility] directly at [phone].  Normal or non-critical lab and imaging results will be communicated to you by MyChart, letter or phone within 4 business days after the clinic has received the results. If you do not hear from us within 7 days, please contact the clinic through MyChart or phone. If you have a critical or abnormal lab result, we will notify you by phone as soon as possible.  Submit refill requests through [employer] or call your pharmacy and they will forward the refill request to us. Please allow 3 business days for your refill to be completed.          Additional Information About Your Visit        MyChart Information     [employer] gives you secure access to your electronic health record. If you see a primary care provider, you can also send messages to your care team and make appointments. If you have questions, please call your primary care clinic.  If you do not have a primary care provider, please call [phone] and they will assist you.        Care EveryWhere ID     This is your Care EveryWhere ID. This could be used by other organizations to access your [facility]  "records  [id number]        Your Vitals Were     Pulse Temperature Height Pulse Oximetry          64 98.4  F (36.9  C) (Oral) 5' 1\" (1.549 m) 96%         Blood Pressure from Last 3 Encounters:   06/22/18 138/61   02/08/18 121/72   01/09/18 110/48    Weight from Last 3 Encounters:   02/08/18 127 lb (57.6 kg)   01/09/18 133 lb (60.3 kg)   09/14/17 133 lb (60.3 kg)              We Performed the Following     ALT     Basic metabolic panel  (Ca, Cl, CO2, Creat, Gluc, K, Na, BUN)     FOOT EXAM  NO CHARGE [34170.114]     HEMOGLOBIN A1C     PODIATRY/FOOT & ANKLE SURGERY REFERRAL     TSH WITH FREE T4 REFLEX          Today's Medication Changes          These changes are accurate as of 6/22/18  2:46 PM.  If you have any questions, ask your nurse or doctor.               These medicines have changed or have updated prescriptions.        Dose/Directions    candesartan cilexetil 32 MG Tabs   This may have changed:    - how much to take  - additional instructions   Used for:  Essential hypertension with goal blood pressure less than 140/90        Dose:  1 tablet   Take 1 tablet by mouth daily for blood pressure.   Quantity:  90 tablet   Refills:  1       diltiazem 360 MG 24 hr CD capsule   Commonly known as:  CARDIZEM CD; CARTIA XT   This may have changed:    - how much to take  - additional instructions   Used for:  Essential hypertension with goal blood pressure less than 140/90        Dose:  360 mg   Take 1 capsule (360 mg) by mouth every evening for blood pressure.   Quantity:  90 capsule   Refills:  1                Primary Care Provider Office Phone # Fax #    Ronnie Arora [phone]       [address]  [facility] [address]        Equal Access to Services     [facility] AH: Hadii luis mar Sogilbert, waaxda luqadaha, qaybta kaalmada adeegyada, andreina leija. So [facility] [phone].    ATENCIÓN: Si habla español, tiene a arzola disposición servicios gratuitos de " asistencia lingüística. [place] al [phone].    We comply with applicable federal civil rights laws and [place] laws. We do not discriminate on the basis of race, color, national origin, age, disability, sex, sexual orientation, or gender identity.            Thank you!     Thank you for choosing [facility]  for your care. Our goal is always to provide you with excellent care. Hearing back from our patients is one way we can continue to improve our services. Please take a few minutes to complete the written survey that you may receive in the mail after your visit with us. Thank you!             Your Updated Medication List - Protect others around you: Learn how to safely use, store and throw away your medicines at www.disposemymeds.org.          This list is accurate as of 6/22/18  2:46 PM.  Always use your most recent med list.                   Brand Name Dispense Instructions for use Diagnosis    aspirin 81 MG tablet      1 TABLET DAILY        candesartan cilexetil 32 MG Tabs     90 tablet    Take 1 tablet by mouth daily for blood pressure.    Essential hypertension with goal blood pressure less than 140/90       cloNIDine 0.1 MG/24HR WK patch    CATAPRES-TTS1    13 patch    Place 1 patch onto the skin once a week for blood pressure.    Essential hypertension with goal blood pressure less than 140/90       diltiazem 360 MG 24 hr CD capsule    CARDIZEM CD; CARTIA XT    90 capsule    Take 1 capsule (360 mg) by mouth every evening for blood pressure.    Essential hypertension with goal blood pressure less than 140/90       hydrochlorothiazide 25 MG tablet    HYDRODIURIL    90 tablet    Take 1 tablet (25 mg) by mouth daily for blood pressure.    Essential hypertension with goal blood pressure less than 140/90       * order for DME     1 Device    Equipment being ordered: Blood glucose meter (per insurance preference), with associated test solution    Type 2 diabetes mellitus without  complication, without long-term current use of insulin (H)       * order for DME     100 strip    Equipment being ordered: Glucose testing strips (per insurance preference). Test blood sugar one time daily.    Type 2 diabetes mellitus without complication, without long-term current use of insulin (H)       * order for DME     100 Device    Equipment being ordered: Lancets (per insurance preference). Test blood sugar one time daily.    Type 2 diabetes mellitus without complication, without long-term current use of insulin (H)       PRESERVISION/LUTEIN PO      Take 1 chew tab by mouth daily        STATIN NOT PRESCRIBED (INTENTIONAL)      Intolerance to atorvastatin.    Type 2 diabetes mellitus without complication, without long-term current use of insulin (H)       * Notice:  This list has 3 medication(s) that are the same as other medications prescribed for you. Read the directions carefully, and ask your doctor or other care provider to review them with you.

## 2018-06-22 NOTE — LETTER
[address] [address]  [phone]        June 27, 2019    Leela Stoutotar  [address] [place] [address]              Dear Leela Stoutotar    This is to remind you that your fasting lab is due.    You may call our office at [phone] to schedule an appointment.    Please disregard this notice if you have already had your labs drawn or made an appointment.        Sincerely,        Ronnie Arora MD

## 2018-06-25 DIAGNOSIS — I10 ESSENTIAL HYPERTENSION WITH GOAL BLOOD PRESSURE LESS THAN 140/90: ICD-10-CM

## 2018-06-25 NOTE — TELEPHONE ENCOUNTER
"Requested Prescriptions   Pending Prescriptions Disp Refills     cloNIDine (CATAPRES-TTS1) 0.1 MG/24HR WK patch [Pharmacy Med Name: CLONIDINE 0.1 MG/DAY PATCH]  Last Written Prescription Date:  01/09/18  Last Fill Quantity: 13,  # refills: 1   Last Office Visit with G, P or [facility] prescribing provider:  06/22/18   Future Office Visit:    13 patch 0     Sig: PLACE 1 PATCH ONTO THE SKIN ONCE A WEEK FOR BLOOD PRESSURE.    Central Acting Antiadrenergic Agents Passed    6/25/2018  1:42 AM       Passed - Blood pressure under 140/90 in past 12 months    BP Readings from Last 3 Encounters:   06/22/18 138/61   02/08/18 121/72   01/09/18 110/48                Passed - Patient is 6 years of age or older       Passed - Recent (12 mo) or future (30 days) visit within the authorizing provider's specialty    Patient had office visit in the last 12 months or has a visit in the next 30 days with authorizing provider or within the authorizing provider's specialty.  See \"Patient Info\" tab in inbasket, or \"Choose Columns\" in Meds & Orders section of the refill encounter.           Passed - Patient not pregnant       Passed - Normal serum creatinine on file within past 12 months    Recent Labs   Lab Test  06/22/18   1401   CR  0.76            Passed - No positive pregnancy test on file in past 12 months          "

## 2018-06-26 NOTE — TELEPHONE ENCOUNTER
Prescription approved per [facility] Refill Protocol.    Catilin Santos RN, BSN

## 2018-06-27 ASSESSMENT — PATIENT HEALTH QUESTIONNAIRE - PHQ9: SUM OF ALL RESPONSES TO PHQ QUESTIONS 1-9: 3

## 2018-07-01 DIAGNOSIS — I10 ESSENTIAL HYPERTENSION WITH GOAL BLOOD PRESSURE LESS THAN 140/90: ICD-10-CM

## 2018-07-01 NOTE — TELEPHONE ENCOUNTER
"Requested Prescriptions   Pending Prescriptions Disp Refills     candesartan cilexetil 32 MG TABS [Pharmacy Med Name: CANDESARTAN CILEXETIL 32 MG TB] 90 tablet 1     Sig: TAKE 1 TABLET BY MOUTH DAILY FOR BLOOD PRESSURE.    Angiotensin-II Receptors Passed    7/1/2018  2:42 PM       Passed - Blood pressure under 140/90 in past 12 months    BP Readings from Last 3 Encounters:   06/22/18 138/61   02/08/18 121/72   01/09/18 110/48                Passed - Recent (12 mo) or future (30 days) visit within the authorizing provider's specialty    Patient had office visit in the last 12 months or has a visit in the next 30 days with authorizing provider or within the authorizing provider's specialty.  See \"Patient Info\" tab in inbasket, or \"Choose Columns\" in Meds & Orders section of the refill encounter.           Passed - Patient is age 18 or older       Passed - No active pregnancy on record       Passed - Normal serum creatinine on file in past 12 months    Recent Labs   Lab Test  06/22/18   1401   CR  0.76            Passed - Normal serum potassium on file in past 12 months    Recent Labs   Lab Test  06/22/18   1401   POTASSIUM  4.0                   Passed - No positive pregnancy test in past 12 months        Last Written Prescription Date:  1/9/18  Last Fill Quantity: 90,  # refills: 1   Last office visit: 6/22/2018 with prescribing provider:  NEIL   Future Office Visit:      "

## 2018-07-03 RX ORDER — CANDESARTAN 32 MG/1
TABLET ORAL
Qty: 90 TABLET | Refills: 1 | Status: SHIPPED | OUTPATIENT
Start: 2018-07-03

## 2018-07-03 NOTE — TELEPHONE ENCOUNTER
Routing refill request to provider for review/approval because:  Drug interaction warning due to ACE inhibitor allergy.    Caitlin Santos RN, BSN

## 2018-07-11 ENCOUNTER — OFFICE VISIT (OUTPATIENT)
Dept: OPHTHALMOLOGY | Facility: CLINIC | Age: 83
End: 2018-07-11
Payer: COMMERCIAL

## 2018-07-11 DIAGNOSIS — D31.31 CHOROIDAL NEVUS, RIGHT: ICD-10-CM

## 2018-07-11 DIAGNOSIS — H52.4 PRESBYOPIA: ICD-10-CM

## 2018-07-11 DIAGNOSIS — Z01.01 ENCOUNTER FOR EXAMINATION OF EYES AND VISION WITH ABNORMAL FINDINGS: Primary | ICD-10-CM

## 2018-07-11 DIAGNOSIS — Z96.1 PSEUDOPHAKIA: ICD-10-CM

## 2018-07-11 DIAGNOSIS — H35.3190 NONEXUDATIVE SENILE MACULAR DEGENERATION OF RETINA: ICD-10-CM

## 2018-07-11 PROCEDURE — 92015 DETERMINE REFRACTIVE STATE: CPT | Performed by: STUDENT IN AN ORGANIZED HEALTH CARE EDUCATION/TRAINING PROGRAM

## 2018-07-11 PROCEDURE — 92014 COMPRE OPH EXAM EST PT 1/>: CPT | Performed by: STUDENT IN AN ORGANIZED HEALTH CARE EDUCATION/TRAINING PROGRAM

## 2018-07-11 ASSESSMENT — CUP TO DISC RATIO
OS_RATIO: 0.2
OD_RATIO: 0.2

## 2018-07-11 ASSESSMENT — REFRACTION_WEARINGRX
OD_ADD: +3.50
OS_CYLINDER: +1.25
OD_SPHERE: -1.00
OS_SPHERE: -2.00
OD_CYLINDER: +2.00
OD_AXIS: 017
OS_ADD: +3.50
OS_AXIS: 105

## 2018-07-11 ASSESSMENT — VISUAL ACUITY
METHOD: SNELLEN - LINEAR
OS_CC: 20/25
CORRECTION_TYPE: GLASSES
OD_CC: 20/40
OD_CC+: -1
OS_CC+: -2
OD_CC: J2
OS_CC: J2

## 2018-07-11 ASSESSMENT — EXTERNAL EXAM - LEFT EYE: OS_EXAM: NORMAL

## 2018-07-11 ASSESSMENT — REFRACTION_MANIFEST
OD_AXIS: 008
OD_SPHERE: -0.75
OS_AXIS: 165
OD_ADD: +3.00
OS_SPHERE: -1.25
OD_CYLINDER: +2.50
OS_CYLINDER: +0.25
OS_ADD: +3.00

## 2018-07-11 ASSESSMENT — TONOMETRY
OS_IOP_MMHG: 15
OD_IOP_MMHG: 14
IOP_METHOD: APPLANATION

## 2018-07-11 ASSESSMENT — CONF VISUAL FIELD
OS_NORMAL: 1
OD_NORMAL: 1

## 2018-07-11 ASSESSMENT — SLIT LAMP EXAM - LIDS
COMMENTS: 2+ BLEPHARITIS
COMMENTS: 2+ BLEPHARITIS

## 2018-07-11 ASSESSMENT — EXTERNAL EXAM - RIGHT EYE: OD_EXAM: NORMAL

## 2018-07-11 NOTE — MR AVS SNAPSHOT
After Visit Summary   7/11/2018    Leela Dudley    MRN: [id number]           Patient Information     Date Of Birth          9/14/1926        Visit Information        Provider Department      7/11/2018 1:00 PM Hi Grande MD [facility]        Today's Diagnoses     Encounter for examination of eyes and vision with abnormal findings    -  1    Presbyopia         Pseudophakia OU, with Yag OS        Nonexudative senile macular degeneration of retina        Choroidal nevus, right          Care Instructions    Glasses prescription given - optional    Hi Grande MD  [phone]          Follow-ups after your visit        Follow-up notes from your care team     Return in about 1 year (around 7/11/2019) for Complete Exam.      Who to contact     If you have questions or need follow up information about today's clinic visit or your schedule please contact [facility] directly at [phone].  Normal or non-critical lab and imaging results will be communicated to you by MyChart, letter or phone within 4 business days after the clinic has received the results. If you do not hear from us within 7 days, please contact the clinic through "[employer]hart or phone. If you have a critical or abnormal lab result, we will notify you by phone as soon as possible.  Submit refill requests through [employer] or call your pharmacy and they will forward the refill request to us. Please allow 3 business days for your refill to be completed.          Additional Information About Your Visit        MyChart Information     [employer] gives you secure access to your electronic health record. If you see a primary care provider, you can also send messages to your care team and make appointments. If you have questions, please call your primary care clinic.  If you do not have a primary care provider, please call [phone] and they will assist you.        Care EveryWhere ID     This is your Care  EveryWhere ID. This could be used by other organizations to access your [facility] medical records  [id number]         Blood Pressure from Last 3 Encounters:   06/22/18 138/61   02/08/18 121/72   01/09/18 110/48    Weight from Last 3 Encounters:   02/08/18 57.6 kg (127 lb)   01/09/18 60.3 kg (133 lb)   09/14/17 60.3 kg (133 lb)              We Performed the Following     EYE EXAM (SIMPLE-NONBILLABLE)     REFRACTIVE STATUS          Today's Medication Changes          These changes are accurate as of 7/11/18  1:59 PM.  If you have any questions, ask your nurse or doctor.               These medicines have changed or have updated prescriptions.        Dose/Directions    diltiazem 360 MG 24 hr CD capsule   Commonly known as:  CARDIZEM CD; CARTIA XT   This may have changed:    - how much to take  - additional instructions   Used for:  Essential hypertension with goal blood pressure less than 140/90        Dose:  360 mg   Take 1 capsule (360 mg) by mouth every evening for blood pressure.   Quantity:  90 capsule   Refills:  1                Primary Care Provider Office Phone # Fax #    Ronnie Arora [phone]       [address]  [place] [address]        Equal Access to Services     JULISA PATEL AH: Hadii luis gutierres hadasho Sodioniali, waaxda luqadaha, qaybta kaalmada adeegyada, andreina leija. So [facility] [phone].    ATENCIÓN: Si habla español, tiene a arzola disposición servicios gratuitos de asistencia lingüística. Llame al [phone].    We comply with applicable federal civil rights laws and [place] laws. We do not discriminate on the basis of race, color, national origin, age, disability, sex, sexual orientation, or gender identity.            Thank you!     Thank you for choosing [facility]  for your care. Our goal is always to provide you with excellent care. Hearing back from our patients is one way we can continue to improve our services. Please  take a few minutes to complete the written survey that you may receive in the mail after your visit with us. Thank you!             Your Updated Medication List - Protect others around you: Learn how to safely use, store and throw away your medicines at www.disposemymeds.org.          This list is accurate as of 7/11/18  1:59 PM.  Always use your most recent med list.                   Brand Name Dispense Instructions for use Diagnosis    aspirin 81 MG tablet      1 TABLET DAILY        candesartan cilexetil 32 MG Tabs     90 tablet    TAKE 1 TABLET BY MOUTH DAILY FOR BLOOD PRESSURE.    Essential hypertension with goal blood pressure less than 140/90       cloNIDine 0.1 MG/24HR WK patch    CATAPRES-TTS1    13 patch    PLACE 1 PATCH ONTO THE SKIN ONCE A WEEK FOR BLOOD PRESSURE.    Essential hypertension with goal blood pressure less than 140/90       diltiazem 360 MG 24 hr CD capsule    CARDIZEM CD; CARTIA XT    90 capsule    Take 1 capsule (360 mg) by mouth every evening for blood pressure.    Essential hypertension with goal blood pressure less than 140/90       hydrochlorothiazide 25 MG tablet    HYDRODIURIL    90 tablet    Take 1 tablet (25 mg) by mouth daily for blood pressure.    Essential hypertension with goal blood pressure less than 140/90       * order for DME     1 Device    Equipment being ordered: Blood glucose meter (per insurance preference), with associated test solution    Type 2 diabetes mellitus without complication, without long-term current use of insulin (H)       * order for DME     100 strip    Equipment being ordered: Glucose testing strips (per insurance preference). Test blood sugar one time daily.    Type 2 diabetes mellitus without complication, without long-term current use of insulin (H)       * order for DME     100 Device    Equipment being ordered: Lancets (per insurance preference). Test blood sugar one time daily.    Type 2 diabetes mellitus without complication, without long-term  current use of insulin (H)       PRESERVISION/LUTEIN PO      Take 1 chew tab by mouth daily        STATIN NOT PRESCRIBED (INTENTIONAL)      Intolerance to atorvastatin.    Type 2 diabetes mellitus without complication, without long-term current use of insulin (H)       * Notice:  This list has 3 medication(s) that are the same as other medications prescribed for you. Read the directions carefully, and ask your doctor or other care provider to review them with you.

## 2018-07-11 NOTE — PROGRESS NOTES
Current Eye Medications:  Chewable eye vitamins     Subjective:  Complete eye exam.   Patient has no complaints.  Vision seems to be ok. Here with her daughter     Objective:  See Ophthalmology Exam.       Assessment:  Leela Dudley is a 89 year old female who presents with:      Pseudophakia OU, with Yag OS        Nonexudative senile macular degeneration of retina Taking AREDS2 eye vitamins       Choroidal nevus, right Stable        Plan:  Glasses prescription given - optional    Hi Grande MD  [phone]

## 2018-07-11 NOTE — LETTER
7/11/2018         RE: Leela Dudley  [address]  [facility] [address]        Dear Colleague,    Thank you for referring your patient, Leela Dudley, to the [facility].    Her eye exam is stable.   Please see a copy of my visit note below.     Current Eye Medications:  Chewable eye vitamins     Subjective:  Complete eye exam.   Patient has no complaints.  Vision seems to be ok. Here with her daughter     Objective:  See Ophthalmology Exam.       Assessment:  Leela Dudley is a 89 year old female who presents with:      Pseudophakia OU, with Yag OS        Nonexudative senile macular degeneration of retina Taking AREDS2 eye vitamins       Choroidal nevus, right Stable        Plan:  Glasses prescription given - optional    Hi Grande MD  [phone]      Again, thank you for allowing me to participate in the care of your patient.        Sincerely,        Hi Grande MD

## 2018-09-13 ENCOUNTER — OFFICE VISIT - HEALTHEAST (OUTPATIENT)
Dept: GERIATRICS | Facility: CLINIC | Age: 83
End: 2018-09-13

## 2018-09-13 ENCOUNTER — AMBULATORY - HEALTHEAST (OUTPATIENT)
Dept: ADMINISTRATIVE | Facility: CLINIC | Age: 83
End: 2018-09-13

## 2018-09-13 DIAGNOSIS — S32.9XXD CLOSED NONDISPLACED FRACTURE OF PELVIS WITH ROUTINE HEALING, UNSPECIFIED PART OF PELVIS, SUBSEQUENT ENCOUNTER: ICD-10-CM

## 2018-09-13 DIAGNOSIS — M15.0 PRIMARY OSTEOARTHRITIS INVOLVING MULTIPLE JOINTS: ICD-10-CM
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
## 2018-09-13 DIAGNOSIS — I10 ESSENTIAL HYPERTENSION: ICD-10-CM

## 2018-09-13 DIAGNOSIS — R13.12 OROPHARYNGEAL DYSPHAGIA: ICD-10-CM

## 2018-09-20 ENCOUNTER — OFFICE VISIT - HEALTHEAST (OUTPATIENT)
Dept: GERIATRICS | Facility: CLINIC | Age: 83
End: 2018-09-20

## 2018-09-20 ENCOUNTER — RECORDS - HEALTHEAST (OUTPATIENT)
Dept: LAB | Facility: CLINIC | Age: 83
End: 2018-09-20

## 2018-09-20 DIAGNOSIS — R13.12 OROPHARYNGEAL DYSPHAGIA: ICD-10-CM
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
## 2018-09-20 DIAGNOSIS — S32.9XXD CLOSED NONDISPLACED FRACTURE OF PELVIS WITH ROUTINE HEALING, UNSPECIFIED PART OF PELVIS, SUBSEQUENT ENCOUNTER: ICD-10-CM

## 2018-09-20 DIAGNOSIS — M15.0 PRIMARY OSTEOARTHRITIS INVOLVING MULTIPLE JOINTS: ICD-10-CM

## 2018-09-20 LAB
ALBUMIN SERPL-MCNC: 2.7 G/DL (ref 3.5–5)
ALP SERPL-CCNC: 83 U/L (ref 45–120)
ALT SERPL W P-5'-P-CCNC: 14 U/L (ref 0–45)
ANION GAP SERPL CALCULATED.3IONS-SCNC: 6 MMOL/L (ref 5–18)
AST SERPL W P-5'-P-CCNC: 11 U/L (ref 0–40)
BILIRUB DIRECT SERPL-MCNC: 0.3 MG/DL
BILIRUB SERPL-MCNC: 0.9 MG/DL (ref 0–1)
BUN SERPL-MCNC: 36 MG/DL (ref 8–28)
CALCIUM SERPL-MCNC: 8.9 MG/DL (ref 8.5–10.5)
CHLORIDE BLD-SCNC: 106 MMOL/L (ref 98–107)
CO2 SERPL-SCNC: 26 MMOL/L (ref 22–31)
CREAT SERPL-MCNC: 0.68 MG/DL (ref 0.6–1.1)
CREAT SERPL-MCNC: 0.68 MG/DL (ref 0.6–1.1)
ERYTHROCYTE [DISTWIDTH] IN BLOOD BY AUTOMATED COUNT: 14.3 % (ref 11–14.5)
GFR SERPL CREATININE-BSD FRML MDRD: >60 ML/MIN/1.73M2
GFR SERPL CREATININE-BSD FRML MDRD: >60 ML/MIN/1.73M2
GLUCOSE BLD-MCNC: 176 MG/DL (ref 70–125)
GLUCOSE SERPL-MCNC: 176 MG/DL (ref 70–125)
HCT VFR BLD AUTO: 32.9 % (ref 35–47)
HGB BLD-MCNC: 11.1 G/DL (ref 12–16)
MCH RBC QN AUTO: 31.1 PG (ref 27–34)
MCHC RBC AUTO-ENTMCNC: 33.7 G/DL (ref 32–36)
MCV RBC AUTO: 92 FL (ref 80–100)
PLATELET # BLD AUTO: 369 THOU/UL (ref 140–440)
PMV BLD AUTO: 9.6 FL (ref 8.5–12.5)
POTASSIUM BLD-SCNC: 4.6 MMOL/L (ref 3.5–5)
POTASSIUM SERPL-SCNC: 4.6 MMOL/L (ref 3.5–5)
PROT SERPL-MCNC: 5.7 G/DL (ref 6–8)
RBC # BLD AUTO: 3.57 MILL/UL (ref 3.8–5.4)
SODIUM SERPL-SCNC: 138 MMOL/L (ref 136–145)
WBC: 13.5 THOU/UL (ref 4–11)

## 2018-09-24 ENCOUNTER — OFFICE VISIT - HEALTHEAST (OUTPATIENT)
Dept: GERIATRICS | Facility: CLINIC | Age: 83
End: 2018-09-24

## 2018-09-24 ENCOUNTER — TRANSFERRED RECORDS (OUTPATIENT)
Dept: HEALTH INFORMATION MANAGEMENT | Facility: CLINIC | Age: 83
End: 2018-09-24

## 2018-09-24 DIAGNOSIS — M15.0 PRIMARY OSTEOARTHRITIS INVOLVING MULTIPLE JOINTS: ICD-10-CM

## 2018-09-24 DIAGNOSIS — R52 PAIN MANAGEMENT: ICD-10-CM

## 2018-09-24 DIAGNOSIS — I10 ESSENTIAL HYPERTENSION: ICD-10-CM

## 2018-09-24 DIAGNOSIS — R13.12 OROPHARYNGEAL DYSPHAGIA: ICD-10-CM

## 2018-09-24 DIAGNOSIS — S32.9XXD: ICD-10-CM

## 2018-09-27 ENCOUNTER — OFFICE VISIT - HEALTHEAST (OUTPATIENT)
Dept: GERIATRICS | Facility: CLINIC | Age: 83
End: 2018-09-27

## 2018-09-27 ENCOUNTER — TRANSFERRED RECORDS (OUTPATIENT)
Dept: HEALTH INFORMATION MANAGEMENT | Facility: CLINIC | Age: 83
End: 2018-09-27

## 2018-09-27 DIAGNOSIS — M25.561 BILATERAL KNEE PAIN: ICD-10-CM

## 2018-09-27 DIAGNOSIS — M25.562 BILATERAL KNEE PAIN: ICD-10-CM

## 2018-09-27 DIAGNOSIS — S32.9XXD CLOSED NONDISPLACED FRACTURE OF PELVIS WITH ROUTINE HEALING, UNSPECIFIED PART OF PELVIS, SUBSEQUENT ENCOUNTER: ICD-10-CM

## 2018-09-27 DIAGNOSIS — R52 PAIN MANAGEMENT: ICD-10-CM

## 2018-09-27 DIAGNOSIS — M15.0 PRIMARY OSTEOARTHRITIS INVOLVING MULTIPLE JOINTS: ICD-10-CM
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
## 2018-09-28 ENCOUNTER — RECORDS - HEALTHEAST (OUTPATIENT)
Dept: LAB | Facility: CLINIC | Age: 83
End: 2018-09-28

## 2018-10-01 ENCOUNTER — TRANSFERRED RECORDS (OUTPATIENT)
Dept: HEALTH INFORMATION MANAGEMENT | Facility: CLINIC | Age: 83
End: 2018-10-01

## 2018-10-01 ENCOUNTER — OFFICE VISIT - HEALTHEAST (OUTPATIENT)
Dept: GERIATRICS | Facility: CLINIC | Age: 83
End: 2018-10-01

## 2018-10-01 DIAGNOSIS — I10 ESSENTIAL HYPERTENSION: ICD-10-CM

## 2018-10-01 DIAGNOSIS — M15.0 PRIMARY OSTEOARTHRITIS INVOLVING MULTIPLE JOINTS: ICD-10-CM

## 2018-10-01 DIAGNOSIS — R52 PAIN MANAGEMENT: ICD-10-CM

## 2018-10-01 DIAGNOSIS — S32.9XXD CLOSED NONDISPLACED FRACTURE OF PELVIS WITH ROUTINE HEALING, UNSPECIFIED PART OF PELVIS, SUBSEQUENT ENCOUNTER: ICD-10-CM
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
## 2018-10-01 LAB
ALBUMIN SERPL-MCNC: 2.7 G/DL (ref 3.5–5)
ALP SERPL-CCNC: 186 U/L (ref 45–120)
ALT SERPL W P-5'-P-CCNC: 11 U/L (ref 0–45)
ANION GAP SERPL CALCULATED.3IONS-SCNC: 4 MMOL/L (ref 5–18)
AST SERPL W P-5'-P-CCNC: 21 U/L (ref 0–40)
BILIRUB DIRECT SERPL-MCNC: 0.2 MG/DL
BILIRUB SERPL-MCNC: 0.7 MG/DL (ref 0–1)
BUN SERPL-MCNC: 26 MG/DL (ref 8–28)
CALCIUM SERPL-MCNC: 8.8 MG/DL (ref 8.5–10.5)
CHLORIDE BLD-SCNC: 111 MMOL/L (ref 98–107)
CO2 SERPL-SCNC: 26 MMOL/L (ref 22–31)
CREAT SERPL-MCNC: 0.67 MG/DL (ref 0.6–1.1)
ERYTHROCYTE [DISTWIDTH] IN BLOOD BY AUTOMATED COUNT: 14.6 % (ref 11–14.5)
GFR SERPL CREATININE-BSD FRML MDRD: >60 ML/MIN/1.73M2
GLUCOSE BLD-MCNC: 138 MG/DL (ref 70–125)
HCT VFR BLD AUTO: 34.7 % (ref 35–47)
HGB BLD-MCNC: 11 G/DL (ref 12–16)
MCH RBC QN AUTO: 30.4 PG (ref 27–34)
MCHC RBC AUTO-ENTMCNC: 31.7 G/DL (ref 32–36)
MCV RBC AUTO: 96 FL (ref 80–100)
PLATELET # BLD AUTO: 295 THOU/UL (ref 140–440)
PMV BLD AUTO: 9.8 FL (ref 8.5–12.5)
POTASSIUM BLD-SCNC: 4.4 MMOL/L (ref 3.5–5)
PROT SERPL-MCNC: 5.6 G/DL (ref 6–8)
RBC # BLD AUTO: 3.62 MILL/UL (ref 3.8–5.4)
SODIUM SERPL-SCNC: 141 MMOL/L (ref 136–145)
WBC: 7.9 THOU/UL (ref 4–11)

## 2018-10-04 ENCOUNTER — OFFICE VISIT - HEALTHEAST (OUTPATIENT)
Dept: GERIATRICS | Facility: CLINIC | Age: 83
End: 2018-10-04

## 2018-10-04 ENCOUNTER — TRANSFERRED RECORDS (OUTPATIENT)
Dept: HEALTH INFORMATION MANAGEMENT | Facility: CLINIC | Age: 83
End: 2018-10-04

## 2018-10-04 DIAGNOSIS — F43.21 SITUATIONAL DEPRESSION: ICD-10-CM
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
## 2018-10-04 DIAGNOSIS — S32.9XXD CLOSED NONDISPLACED FRACTURE OF PELVIS WITH ROUTINE HEALING, UNSPECIFIED PART OF PELVIS, SUBSEQUENT ENCOUNTER: ICD-10-CM

## 2018-10-04 DIAGNOSIS — M15.0 PRIMARY OSTEOARTHRITIS INVOLVING MULTIPLE JOINTS: ICD-10-CM

## 2018-10-04 DIAGNOSIS — I10 ESSENTIAL HYPERTENSION: ICD-10-CM

## 2018-10-08 ENCOUNTER — OFFICE VISIT - HEALTHEAST (OUTPATIENT)
Dept: GERIATRICS | Facility: CLINIC | Age: 83
End: 2018-10-08

## 2018-10-08 DIAGNOSIS — I10 ESSENTIAL HYPERTENSION: ICD-10-CM

## 2018-10-08 DIAGNOSIS — M15.0 PRIMARY OSTEOARTHRITIS INVOLVING MULTIPLE JOINTS: ICD-10-CM

## 2018-10-08 DIAGNOSIS — S32.9XXD CLOSED NONDISPLACED FRACTURE OF PELVIS WITH ROUTINE HEALING, UNSPECIFIED PART OF PELVIS, SUBSEQUENT ENCOUNTER: ICD-10-CM

## 2018-10-08 DIAGNOSIS — F43.21 SITUATIONAL DEPRESSION: ICD-10-CM

## 2018-10-08 DIAGNOSIS — R13.10 DYSPHAGIA: ICD-10-CM

## 2018-10-08 DIAGNOSIS — R52 PAIN MANAGEMENT: ICD-10-CM
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
## 2018-10-11 ENCOUNTER — OFFICE VISIT - HEALTHEAST (OUTPATIENT)
Dept: GERIATRICS | Facility: CLINIC | Age: 83
End: 2018-10-11

## 2018-10-11 DIAGNOSIS — S32.9XXD CLOSED NONDISPLACED FRACTURE OF PELVIS WITH ROUTINE HEALING, UNSPECIFIED PART OF PELVIS, SUBSEQUENT ENCOUNTER: ICD-10-CM

## 2018-10-11 DIAGNOSIS — R13.12 OROPHARYNGEAL DYSPHAGIA: ICD-10-CM

## 2018-10-11 DIAGNOSIS — M15.0 PRIMARY OSTEOARTHRITIS INVOLVING MULTIPLE JOINTS: ICD-10-CM

## 2018-10-11 DIAGNOSIS — F43.21 SITUATIONAL DEPRESSION: ICD-10-CM
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
## 2018-10-15 ENCOUNTER — TRANSFERRED RECORDS (OUTPATIENT)
Dept: HEALTH INFORMATION MANAGEMENT | Facility: CLINIC | Age: 83
End: 2018-10-15

## 2018-10-15 ENCOUNTER — OFFICE VISIT - HEALTHEAST (OUTPATIENT)
Dept: GERIATRICS | Facility: CLINIC | Age: 83
End: 2018-10-15

## 2018-10-15 DIAGNOSIS — R13.10 DYSPHAGIA: ICD-10-CM

## 2018-10-15 DIAGNOSIS — M15.0 PRIMARY OSTEOARTHRITIS INVOLVING MULTIPLE JOINTS: ICD-10-CM

## 2018-10-15 DIAGNOSIS — I10 ESSENTIAL HYPERTENSION: ICD-10-CM

## 2018-10-15 DIAGNOSIS — F43.21 SITUATIONAL DEPRESSION: ICD-10-CM

## 2018-10-15 DIAGNOSIS — E46 MALNUTRITION (H): ICD-10-CM

## 2018-10-15 DIAGNOSIS — S32.9XXD CLOSED NONDISPLACED FRACTURE OF PELVIS WITH ROUTINE HEALING, UNSPECIFIED PART OF PELVIS, SUBSEQUENT ENCOUNTER: ICD-10-CM

## 2018-10-16 ENCOUNTER — HOME CARE/HOSPICE - HEALTHEAST (OUTPATIENT)
Dept: HOME HEALTH SERVICES | Facility: HOME HEALTH | Age: 83
End: 2018-10-16

## 2018-10-16 ENCOUNTER — RECORDS - HEALTHEAST (OUTPATIENT)
Dept: LAB | Facility: CLINIC | Age: 83
End: 2018-10-16

## 2018-10-16 ENCOUNTER — COMMUNICATION - HEALTHEAST (OUTPATIENT)
Dept: GERIATRICS | Facility: CLINIC | Age: 83
End: 2018-10-16

## 2018-10-16 LAB
ALBUMIN SERPL-MCNC: 2.6 G/DL (ref 3.5–5)
ANION GAP SERPL CALCULATED.3IONS-SCNC: 8 MMOL/L (ref 5–18)
BUN SERPL-MCNC: 18 MG/DL (ref 8–28)
CALCIUM SERPL-MCNC: 8.7 MG/DL (ref 8.5–10.5)
CHLORIDE BLD-SCNC: 107 MMOL/L (ref 98–107)
CO2 SERPL-SCNC: 24 MMOL/L (ref 22–31)
CREAT SERPL-MCNC: 0.6 MG/DL (ref 0.6–1.1)
GFR SERPL CREATININE-BSD FRML MDRD: >60 ML/MIN/1.73M2
GLUCOSE BLD-MCNC: 131 MG/DL (ref 70–125)
POTASSIUM BLD-SCNC: 3.8 MMOL/L (ref 3.5–5)
PREALB SERPL-MCNC: 15.8 MG/DL (ref 19–38)
SODIUM SERPL-SCNC: 139 MMOL/L (ref 136–145)

## 2018-10-17 ENCOUNTER — MEDICAL CORRESPONDENCE (OUTPATIENT)
Dept: HEALTH INFORMATION MANAGEMENT | Facility: CLINIC | Age: 83
End: 2018-10-17

## 2018-10-18 ENCOUNTER — TELEPHONE (OUTPATIENT)
Dept: FAMILY MEDICINE | Facility: CLINIC | Age: 83
End: 2018-10-18

## 2018-10-18 ENCOUNTER — AMBULATORY - HEALTHEAST (OUTPATIENT)
Dept: GERIATRICS | Facility: CLINIC | Age: 83
End: 2018-10-18

## 2018-10-18 NOTE — TELEPHONE ENCOUNTER
Writer called and spoke with patient's daughter. She states that patient was discharged from a TCU and is now in a private residential home. She states the owner Talita Harris is requesting authorization but Dayan does not know what exactly is needed from Dr. Arora' care team nor does she have a fax number.     Writer advised Dayan to have Talita contact the clinic with what specific needs/documents she has, as well as a fax number, and any other helpful information. She states understanding and will contact Talita.     Writer also spoke with [facility] - since patient is going into long-term care now, she will not be utilizing home care services through [facility].     Olive Weber RN

## 2018-10-18 NOTE — TELEPHONE ENCOUNTER
Reason for Call:  Other FYI     Detailed comments: [facility] wanted to give care team a FYI regarding the order they received. Per medicare guidelines, they're unable to go out to a long term care facility in which that is where the patient Is located. Please call Mariola for further information. Thanks.     Phone Number Mariola can be reached at: [phone]    Best Time: anytime     Can we leave a detailed message on this number? YES    Call taken on 10/18/2018 at 10:44 AM by Juancarlos Harmon

## 2018-11-05 ENCOUNTER — RECORDS - HEALTHEAST (OUTPATIENT)
Dept: LAB | Facility: CLINIC | Age: 83
End: 2018-11-05

## 2018-11-05 LAB
ANION GAP SERPL CALCULATED.3IONS-SCNC: 7 MMOL/L (ref 5–18)
BUN SERPL-MCNC: 11 MG/DL (ref 8–28)
CALCIUM SERPL-MCNC: 9.1 MG/DL (ref 8.5–10.5)
CHLORIDE BLD-SCNC: 106 MMOL/L (ref 98–107)
CO2 SERPL-SCNC: 26 MMOL/L (ref 22–31)
CREAT SERPL-MCNC: 0.59 MG/DL (ref 0.6–1.1)
GFR SERPL CREATININE-BSD FRML MDRD: >60 ML/MIN/1.73M2
GLUCOSE BLD-MCNC: 173 MG/DL (ref 70–125)
POTASSIUM BLD-SCNC: 4.2 MMOL/L (ref 3.5–5)
SODIUM SERPL-SCNC: 139 MMOL/L (ref 136–145)
TSH SERPL DL<=0.005 MIU/L-ACNC: 2.39 UIU/ML (ref 0.3–5)
VIT B12 SERPL-MCNC: 542 PG/ML (ref 213–816)

## 2018-11-06 LAB
25(OH)D3 SERPL-MCNC: 28.5 NG/ML (ref 30–80)
HBA1C MFR BLD: 6.8 % (ref 4.2–6.1)

## 2019-02-11 ENCOUNTER — OFFICE VISIT (OUTPATIENT)
Dept: ORTHOPEDICS | Facility: CLINIC | Age: 84
End: 2019-02-11
Payer: COMMERCIAL

## 2019-02-11 DIAGNOSIS — M75.102 ROTATOR CUFF TEAR ARTHROPATHY OF LEFT SHOULDER: ICD-10-CM

## 2019-02-11 DIAGNOSIS — M12.812 ROTATOR CUFF TEAR ARTHROPATHY OF LEFT SHOULDER: ICD-10-CM

## 2019-02-11 PROCEDURE — 99213 OFFICE O/P EST LOW 20 MIN: CPT | Mod: 25 | Performed by: ORTHOPAEDIC SURGERY

## 2019-02-11 PROCEDURE — 20610 DRAIN/INJ JOINT/BURSA W/O US: CPT | Mod: LT | Performed by: ORTHOPAEDIC SURGERY

## 2019-02-11 RX ORDER — TRIAMCINOLONE ACETONIDE 40 MG/ML
40 INJECTION, SUSPENSION INTRA-ARTICULAR; INTRAMUSCULAR ONCE
Qty: 1 ML | Refills: 0 | OUTPATIENT
Start: 2019-02-11 | End: 2019-02-11

## 2019-02-11 NOTE — PROGRESS NOTES
Leela Dudley is a 89 year old female who is seen as self referral for left shoulder pain.  sshe has had shoulder pain for the last 2 months. She is not sure of any injury. X-ray from 11/29/18 showed acromioclavicular arthritis and probable cuff tear arthropathy with superior migration of the humerus. She has dull constant pain rated 5 out of 10. Hurts most with moving the arm. She lives in a senior living facility. Her only activity is really going back and forth to lunch using a walker. She does not do arts and crafts. She does not make her own meals.    Past Medical History:   Diagnosis Date     Acute renal failure (H) 4/18/2012     Elevated troponin I level 6/18/2015     GERD (gastroesophageal reflux disease)      HTN     ESSENTIAL & WHITE COAT     Hyperlipidemia      OA (osteoarthritis) of knee      Osteopenia     on fosamax since 2/22/2005     Pneumonia      Type 2 diabetes mellitus without complication, without long-term current use of insulin (H) 2/8/2018       Past Surgical History:   Procedure Laterality Date     CATARACT IOL, RT/LT       CHOLECYSTECTOMY, LAPOROSCOPIC       TONSILLECTOMY         Family History   Problem Relation Age of Onset     Macular Degeneration Sister      Thyroid Disease Son      Diabetes No family hx of        Social History     Socioeconomic History     Marital status:      Spouse name: Not on file     Number of children: Not on file     Years of education: Not on file     Highest education level: Not on file   Social Needs     Financial resource strain: Not on file     Food insecurity - worry: Not on file     Food insecurity - inability: Not on file     Transportation needs - medical: Not on file     Transportation needs - non-medical: Not on file   Occupational History     Not on file   Tobacco Use     Smoking status: Never Smoker     Smokeless tobacco: Never Used   Substance and Sexual Activity     Alcohol use: No     Drug use: No     Sexual activity: No   Other  Topics Concern     Parent/sibling w/ CABG, MI or angioplasty before 65F 55M? Not Asked   Social History Narrative     Not on file       Current Outpatient Medications   Medication Sig Dispense Refill     ASPIRIN 81 MG OR TABS 1 TABLET DAILY       candesartan cilexetil 32 MG TABS TAKE 1 TABLET BY MOUTH DAILY FOR BLOOD PRESSURE. 90 tablet 1     cloNIDine (CATAPRES-TTS1) 0.1 MG/24HR WK patch PLACE 1 PATCH ONTO THE SKIN ONCE A WEEK FOR BLOOD PRESSURE. 13 patch 0     diltiazem (CARDIZEM CD; CARTIA XT) 360 MG 24 hr CD capsule Take 1 capsule (360 mg) by mouth every evening for blood pressure. (Patient taking differently: Take 180 mg by mouth every evening for blood pressure.) 90 capsule 1     hydrochlorothiazide (HYDRODIURIL) 25 MG tablet Take 1 tablet (25 mg) by mouth daily for blood pressure. (Patient not taking: Reported on 6/22/2018) 90 tablet 1     Multiple Vitamins-Minerals (PRESERVISION/LUTEIN PO) Take 1 chew tab by mouth daily       order for DME Equipment being ordered: Blood glucose meter (per insurance preference), with associated test solution 1 Device 0     order for DME Equipment being ordered: Glucose testing strips (per insurance preference). Test blood sugar one time daily. 100 strip 3     order for DME Equipment being ordered: Lancets (per insurance preference). Test blood sugar one time daily. 100 Device 3     STATIN NOT PRESCRIBED, INTENTIONAL, Intolerance to atorvastatin.         Allergies   Allergen Reactions     Atorvastatin Calcium      Muscle pain     Codeine Sulfate Nausea and Vomiting     sweats     Norvasc [Amlodipine Besylate]      Urinary frequency     Zestril [Ace Inhibitors]      cough       REVIEW OF SYSTEMS:  CONSTITUTIONAL:  NEGATIVE for fever, chills, change in weight, not feeling tired  SKIN:  NEGATIVE for worrisome rashes, no skin lumps, no skin ulcers and no non-healing wounds  EYES:  NEGATIVE for vision changes or irritation.  ENT/MOUTH:  NEGATIVE.  No hearing loss, no hoarseness, no  difficulty swallowing.  RESP:  NEGATIVE. No cough or shortness of breath.  CV:  NEGATIVE for chest pain, palpitations or peripheral edema  GI:  NEGATIVE for nausea, abdominal pain, heartburn, or change in bowel habits  :  Negative. No dysuria, no hematuria  MUSCULOSKELETAL:  See HPI above  NEURO:  NEGATIVE . No headaches, no dizziness,  no numbness  ENDOCRINE:  NEGATIVE for temperature intolerance, skin/hair changes  HEME/ALLERGY/IMMUNE:  NEGATIVE for bleeding problems  PSYCHIATRIC:  NEGATIVE. no anxiety, no depression.     Exam:  Vitals: There were no vitals taken for this visit.  BMI= There is no height or weight on file to calculate BMI.  Constitutional:  healthy, alert and no distress  Neuro: Alert and Oriented x 3, Sensation grossly WNL.  Psych: Affect normal   Respiratory: Breathing not labored.  Cardiovascular: normal peripheral pulses  Lymph: no adenopathy  Skin: No rashes,worrisome lesions or skin problems  She has full range of motion of the right shoulder.  Left shoulder has flexion to 120 passively. She has passive external rotation to 60, passive internal rotation to 60.  She is active left shoulder flexion to about 60 degrees.  She has significant weakness of resisted external rotation on the left. No pain or weakness with resisted internal rotation.  Sensation, motor and circulation are intact.    Assessment:  Left shoulder cuff tear arthropathy.  Plan:  Her facility apparently does not want her to use anti-inflammatories..  She does not have to be active with the shoulder.  She would like more pain relief.  Patient desires injection today of left shoulder(s).  Risks, benefits, potential complications and alternatives were discussed.  With the patient's consent, sterile prep was performed of left shoulder(s).  Left shoulder was injected with Kenalog 40 mg and lidocaine at shoulder subacromial space from the posterolateral approach .  Return to clinic as needed.

## 2019-02-11 NOTE — PATIENT INSTRUCTIONS
You have had a steroid injection today.  For the first 2 hours there will likely be some numbing in the joint from the lidocaine.  This is a good sign, indicating that the injection is in the right place.  In 2 hours the lidocaine will wear off, and the joint will hurt like you had a shot.  Each day the cortisone makes it feel better.  It reaches peak effect in 2 weeks.  We expect it to last for 3 months.  You may resume regular activity when you feel ready.  If you are diabetic, your glucoses will be quite high for several days.    Left shoulder has cuff tear arthropathy and impingement.  No special treatment needed.

## 2019-02-11 NOTE — LETTER
2/11/2019         RE: Leela Dudley  [address]  [place] [address]        Dear Colleague,    Thank you for referring your patient, Leela Dudley, to the [facility]. Please see a copy of my visit note below.    Leela Dudley is a 89 year old female who is seen as self referral for left shoulder pain.  sshe has had shoulder pain for the last 2 months. She is not sure of any injury. X-ray from 11/29/18 showed acromioclavicular arthritis and probable cuff tear arthropathy with superior migration of the humerus. She has dull constant pain rated 5 out of 10. Hurts most with moving the arm. She lives in a senior living facility. Her only activity is really going back and forth to lunch using a walker. She does not do arts and crafts. She does not make her own meals.    Past Medical History:   Diagnosis Date     Acute renal failure (H) 4/18/2012     Elevated troponin I level 6/18/2015     GERD (gastroesophageal reflux disease)      HTN     ESSENTIAL & WHITE COAT     Hyperlipidemia      OA (osteoarthritis) of knee      Osteopenia     on fosamax since 2/22/2005     Pneumonia      Type 2 diabetes mellitus without complication, without long-term current use of insulin (H) 2/8/2018       Past Surgical History:   Procedure Laterality Date     CATARACT IOL, RT/LT       CHOLECYSTECTOMY, LAPOROSCOPIC       TONSILLECTOMY         Family History   Problem Relation Age of Onset     Macular Degeneration Sister      Thyroid Disease Son      Diabetes No family hx of        Social History     Socioeconomic History     Marital status:      Spouse name: Not on file     Number of children: Not on file     Years of education: Not on file     Highest education level: Not on file   Social Needs     Financial resource strain: Not on file     Food insecurity - worry: Not on file     Food insecurity - inability: Not on file     Transportation needs - medical: Not on file     Transportation needs -  non-medical: Not on file   Occupational History     Not on file   Tobacco Use     Smoking status: Never Smoker     Smokeless tobacco: Never Used   Substance and Sexual Activity     Alcohol use: No     Drug use: No     Sexual activity: No   Other Topics Concern     Parent/sibling w/ CABG, MI or angioplasty before 65F 55M? Not Asked   Social History Narrative     Not on file       Current Outpatient Medications   Medication Sig Dispense Refill     ASPIRIN 81 MG OR TABS 1 TABLET DAILY       candesartan cilexetil 32 MG TABS TAKE 1 TABLET BY MOUTH DAILY FOR BLOOD PRESSURE. 90 tablet 1     cloNIDine (CATAPRES-TTS1) 0.1 MG/24HR WK patch PLACE 1 PATCH ONTO THE SKIN ONCE A WEEK FOR BLOOD PRESSURE. 13 patch 0     diltiazem (CARDIZEM CD; CARTIA XT) 360 MG 24 hr CD capsule Take 1 capsule (360 mg) by mouth every evening for blood pressure. (Patient taking differently: Take 180 mg by mouth every evening for blood pressure.) 90 capsule 1     hydrochlorothiazide (HYDRODIURIL) 25 MG tablet Take 1 tablet (25 mg) by mouth daily for blood pressure. (Patient not taking: Reported on 6/22/2018) 90 tablet 1     Multiple Vitamins-Minerals (PRESERVISION/LUTEIN PO) Take 1 chew tab by mouth daily       order for DME Equipment being ordered: Blood glucose meter (per insurance preference), with associated test solution 1 Device 0     order for DME Equipment being ordered: Glucose testing strips (per insurance preference). Test blood sugar one time daily. 100 strip 3     order for DME Equipment being ordered: Lancets (per insurance preference). Test blood sugar one time daily. 100 Device 3     STATIN NOT PRESCRIBED, INTENTIONAL, Intolerance to atorvastatin.         Allergies   Allergen Reactions     Atorvastatin Calcium      Muscle pain     Codeine Sulfate Nausea and Vomiting     sweats     Norvasc [Amlodipine Besylate]      Urinary frequency     Zestril [Ace Inhibitors]      cough       REVIEW OF SYSTEMS:  CONSTITUTIONAL:  NEGATIVE for fever,  chills, change in weight, not feeling tired  SKIN:  NEGATIVE for worrisome rashes, no skin lumps, no skin ulcers and no non-healing wounds  EYES:  NEGATIVE for vision changes or irritation.  ENT/MOUTH:  NEGATIVE.  No hearing loss, no hoarseness, no difficulty swallowing.  RESP:  NEGATIVE. No cough or shortness of breath.  CV:  NEGATIVE for chest pain, palpitations or peripheral edema  GI:  NEGATIVE for nausea, abdominal pain, heartburn, or change in bowel habits  :  Negative. No dysuria, no hematuria  MUSCULOSKELETAL:  See HPI above  NEURO:  NEGATIVE . No headaches, no dizziness,  no numbness  ENDOCRINE:  NEGATIVE for temperature intolerance, skin/hair changes  HEME/ALLERGY/IMMUNE:  NEGATIVE for bleeding problems  PSYCHIATRIC:  NEGATIVE. no anxiety, no depression.     Exam:  Vitals: There were no vitals taken for this visit.  BMI= There is no height or weight on file to calculate BMI.  Constitutional:  healthy, alert and no distress  Neuro: Alert and Oriented x 3, Sensation grossly WNL.  Psych: Affect normal   Respiratory: Breathing not labored.  Cardiovascular: normal peripheral pulses  Lymph: no adenopathy  Skin: No rashes,worrisome lesions or skin problems  She has full range of motion of the right shoulder.  Left shoulder has flexion to 120 passively. She has passive external rotation to 60, passive internal rotation to 60.  She is active left shoulder flexion to about 60 degrees.  She has significant weakness of resisted external rotation on the left. No pain or weakness with resisted internal rotation.  Sensation, motor and circulation are intact.    Assessment:  Left shoulder cuff tear arthropathy.  Plan:  Her facility apparently does not want her to use anti-inflammatories..  She does not have to be active with the shoulder.  She would like more pain relief.  Patient desires injection today of left shoulder(s).  Risks, benefits, potential complications and alternatives were discussed.  With the patient's  consent, sterile prep was performed of left shoulder(s).  Left shoulder was injected with Kenalog 40 mg and lidocaine at shoulder subacromial space from the posterolateral approach .  Return to clinic as needed.         The patient's left shoulder was prepped with betadine solution after verification of allergies. Area approximately 10 cm x 10 cm prepped in a sterile fashion. After injection, betadine removed with soap and water and band-aids applied.    1ml kenalog with 1% lidocaine plain injected into patient's left shoulder  LOT# [id number]  Exp. 03/2020    Navneet Funez PA-C  Supervising physician: Felice Walden MD  Dept. of Orthopedics  [facility]          Patient desires injection today of left shoulder(s).  Risks, benefits, potential complications and alternatives were discussed.  With the patient's consent, sterile prep was performed of left shoulder(s).  Left shoulder was injected with Kenalog 40 mg and lidocaine at shoulder subacromial space from the posterolateral approach .  Return to clinic as needed.    Navneet Funez PA-C  Supervising physician: Felice Walden MD  Dept. of Orthopedics  [facility]               Again, thank you for allowing me to participate in the care of your patient.        Sincerely,        Felice Walden MD

## 2019-02-11 NOTE — PROGRESS NOTES
The patient's left shoulder was prepped with betadine solution after verification of allergies. Area approximately 10 cm x 10 cm prepped in a sterile fashion. After injection, betadine removed with soap and water and band-aids applied.    1ml kenalog with 1% lidocaine plain injected into patient's left shoulder  LOT# [id number]  Exp. 03/2020    Navneet Funez PA-C  Supervising physician: Felice Walden MD  Dept. of Orthopedics  [facility]

## 2019-05-07 ENCOUNTER — RECORDS - HEALTHEAST (OUTPATIENT)
Dept: LAB | Facility: CLINIC | Age: 84
End: 2019-05-07

## 2019-05-08 LAB
ANION GAP SERPL CALCULATED.3IONS-SCNC: 8 MMOL/L (ref 5–18)
BUN SERPL-MCNC: 29 MG/DL (ref 8–28)
CALCIUM SERPL-MCNC: 9.4 MG/DL (ref 8.5–10.5)
CHLORIDE BLD-SCNC: 108 MMOL/L (ref 98–107)
CO2 SERPL-SCNC: 25 MMOL/L (ref 22–31)
CREAT SERPL-MCNC: 0.69 MG/DL (ref 0.6–1.1)
GFR SERPL CREATININE-BSD FRML MDRD: >60 ML/MIN/1.73M2
GLUCOSE BLD-MCNC: 168 MG/DL (ref 70–125)
POTASSIUM BLD-SCNC: 3.9 MMOL/L (ref 3.5–5)
SODIUM SERPL-SCNC: 141 MMOL/L (ref 136–145)
TSH SERPL DL<=0.005 MIU/L-ACNC: 1.81 UIU/ML (ref 0.3–5)

## 2019-05-09 LAB
25(OH)D3 SERPL-MCNC: 25.4 NG/ML (ref 30–80)
HBA1C MFR BLD: 7.7 % (ref 4.2–6.1)

## 2019-06-13 ENCOUNTER — TELEPHONE (OUTPATIENT)
Dept: FAMILY MEDICINE | Facility: CLINIC | Age: 84
End: 2019-06-13

## 2019-06-13 NOTE — TELEPHONE ENCOUNTER
Reason for Call:  Other call back    Detailed comments: Would like a referral for ENT. Please call as soon as referral is placed.    Phone Number Patient can be reached at: [phone]    Best Time: Any    Can we leave a detailed message on this number? YES    Call taken on 6/13/2019 at 3:32 PM by Billy Rios ENT

## 2019-06-13 NOTE — TELEPHONE ENCOUNTER
Called and spoke to patient's daughter Dayan. She states mom has ongoing problems with swallowing/dysphagia for many years. Today having difficulty throughout whole meal because of excessive phlegm causing choking throughout the entire mealtime. Patient lives in a assisted living facility and has their own doctors that come every month. Last test ordered by them was September 2018 swallow test at [facility]. Doctor's next visit is 6/24/19.     [facility], private residential, in [place] phone [phone] is owner Katherine Damico.    Kenney Proctor CMA

## 2019-06-13 NOTE — TELEPHONE ENCOUNTER
Routing to provider to please advise on patient request for ENT referral and update that MA received from staff at [facility].     Appears patient was last seen in clinic at [place] nearly one year ago. Should patient's daughter be advised to ask [facility] provider staff when they see patient next on 6/24/19 or is a  provider willing to order?    Routing to provider to review and advise.     Olive Weber, RN, BSN

## 2019-06-13 NOTE — TELEPHONE ENCOUNTER
Please let staff know that the provider of the facility can order another swallow test to see if there are any changes. Speech therapy can also recommend meal plan to prevent aspiration if needed.    Pete Wilson MD

## 2019-06-14 NOTE — TELEPHONE ENCOUNTER
This writer attempted to contact Dayan on 06/14/19      Reason for call informed Dr. Wilson message and left detailed message.      If patient calls back:   1st floor [facility] Team (MA/TC) called. Inform patient that someone from the team will contact them, document that pt called and route to care team.         Rita Carvajal MA

## 2019-07-15 ENCOUNTER — OFFICE VISIT (OUTPATIENT)
Dept: OPHTHALMOLOGY | Facility: CLINIC | Age: 84
End: 2019-07-15
Payer: COMMERCIAL

## 2019-07-15 DIAGNOSIS — H52.4 PRESBYOPIA: ICD-10-CM

## 2019-07-15 DIAGNOSIS — H35.3131 EARLY DRY STAGE NONEXUDATIVE AGE-RELATED MACULAR DEGENERATION OF BOTH EYES: ICD-10-CM

## 2019-07-15 DIAGNOSIS — Z01.01 ENCOUNTER FOR EXAMINATION OF EYES AND VISION WITH ABNORMAL FINDINGS: Primary | ICD-10-CM

## 2019-07-15 DIAGNOSIS — Z96.1 PSEUDOPHAKIA: ICD-10-CM

## 2019-07-15 DIAGNOSIS — D31.31 CHOROIDAL NEVUS, RIGHT: ICD-10-CM

## 2019-07-15 PROCEDURE — 92014 COMPRE OPH EXAM EST PT 1/>: CPT | Performed by: STUDENT IN AN ORGANIZED HEALTH CARE EDUCATION/TRAINING PROGRAM

## 2019-07-15 PROCEDURE — 92015 DETERMINE REFRACTIVE STATE: CPT | Performed by: STUDENT IN AN ORGANIZED HEALTH CARE EDUCATION/TRAINING PROGRAM

## 2019-07-15 ASSESSMENT — CUP TO DISC RATIO
OS_RATIO: 0.2
OD_RATIO: 0.2

## 2019-07-15 ASSESSMENT — VISUAL ACUITY
OD_CC+: -1
METHOD: SNELLEN - LINEAR
OS_CC+: -1
OS_CC: 20/40
CORRECTION_TYPE: GLASSES
OD_CC: 20/50

## 2019-07-15 ASSESSMENT — TONOMETRY
OD_IOP_MMHG: 16
OS_IOP_MMHG: 16
IOP_METHOD: APPLANATION

## 2019-07-15 ASSESSMENT — REFRACTION_MANIFEST
OD_CYLINDER: +2.00
OS_SPHERE: -2.00
OD_AXIS: 015
OD_SPHERE: -1.50
OS_ADD: +3.25
OS_AXIS: 105
OS_CYLINDER: +1.50
OD_ADD: +3.25

## 2019-07-15 ASSESSMENT — EXTERNAL EXAM - RIGHT EYE: OD_EXAM: NORMAL

## 2019-07-15 ASSESSMENT — REFRACTION_WEARINGRX
OD_ADD: +3.50
OS_SPHERE: -2.00
OD_CYLINDER: +2.00
SPECS_TYPE: BIFOCAL-LINED
OS_AXIS: 105
OS_CYLINDER: +1.25
OD_SPHERE: -1.00
OD_AXIS: 017
OS_ADD: +3.50

## 2019-07-15 ASSESSMENT — SLIT LAMP EXAM - LIDS: COMMENTS: 2+ BLEPHARITIS

## 2019-07-15 ASSESSMENT — EXTERNAL EXAM - LEFT EYE: OS_EXAM: NORMAL

## 2019-07-15 ASSESSMENT — CONF VISUAL FIELD
OD_NORMAL: 1
OS_NORMAL: 1

## 2019-07-15 NOTE — PROGRESS NOTES
Current Eye Medications:  AREDS2 eye vitamins-chewables     Subjective:  Here for complete today.  Lives in assisted living. Says she hasn't noticed much trouble with the vision. Sees very well when reading, J1 with glasses on.      Objective:  See Ophthalmology Exam.      Assessment:  Leela Dudley is a 89 year old female who presents with:     Early dry stage nonexudative age-related macular degeneration of both eyes Stable in both eyes       Pseudophakia OU, with Yag OS      Choroidal nevus, right        Plan:  Continue AREDS2 eye vitamins  Glasses prescription given - optional to update    Hi Grande MD  [phone]

## 2019-07-15 NOTE — LETTER
7/15/2019         RE: Leela Dudley  [address]  [place] [address]        Dear Colleague,    Thank you for referring your patient, Leela Dudley, to the [facility]. Please see a copy of my visit note below.     Current Eye Medications:  AREDS2 eye vitamins-chewables     Subjective:  Here for complete today.  Lives in assisted living. Says she hasn't noticed much trouble with the vision. Sees very well when reading, J1 with glasses on.      Objective:  See Ophthalmology Exam.      Assessment:  Leela Dudley is a 89 year old female who presents with:     Early dry stage nonexudative age-related macular degeneration of both eyes Stable in both eyes       Pseudophakia OU, with Yag OS      Choroidal nevus, right        Plan:  Continue AREDS2 eye vitamins  Glasses prescription given - optional to update    Hi Grande MD  [phone]             Again, thank you for allowing me to participate in the care of your patient.        Sincerely,        Hi Grande MD

## 2019-07-15 NOTE — PATIENT INSTRUCTIONS
Continue AREDS2 eye vitamins    Glasses prescription given - optional to update    Hi Grande MD  [phone]

## 2019-08-01 ENCOUNTER — DOCUMENTATION ONLY (OUTPATIENT)
Dept: FAMILY MEDICINE | Facility: CLINIC | Age: 84
End: 2019-08-01

## 2019-08-01 NOTE — PROGRESS NOTES
This patient has overdue labs. A letter was sent on 6/27/2019 and there has been no lab appointment made. If you still want these labs done, please have your care team contact the patient to make a lab appointment. Otherwise, please have the labs discontinued and close the encounter.    Thank you,  [facility]

## 2019-08-08 ENCOUNTER — OFFICE VISIT (OUTPATIENT)
Dept: OTOLARYNGOLOGY | Facility: CLINIC | Age: 84
End: 2019-08-08
Payer: COMMERCIAL

## 2019-08-08 VITALS — OXYGEN SATURATION: 98 % | SYSTOLIC BLOOD PRESSURE: 131 MMHG | HEART RATE: 61 BPM | DIASTOLIC BLOOD PRESSURE: 66 MMHG

## 2019-08-08 DIAGNOSIS — R13.12 OROPHARYNGEAL DYSPHAGIA: Primary | ICD-10-CM

## 2019-08-08 DIAGNOSIS — R13.10 SWALLOWING DISORDER: ICD-10-CM

## 2019-08-08 PROCEDURE — 99204 OFFICE O/P NEW MOD 45 MIN: CPT | Performed by: OTOLARYNGOLOGY

## 2019-08-08 NOTE — PATIENT INSTRUCTIONS
Scheduling Information  To schedule your CT/MRI scan, please contact [facility] at [phone] OR [facility] at [phone]    To schedule your Surgery, please contact our Specialty Schedulers at [phone]      ENT Clinic Locations Clinic Hours Telephone Number     [facility]  [address] [place] [address]   Monday:           1:00pm -- 5:00pm    Friday:              8:00am - 12:00pm   To schedule/reschedule an appointment with   Dr. Wei,   please contact our   Specialty Scheduling Department at:     [phone]       [facility]  [address] [place] [address] Tuesday:          8:00am -- 2:00pm         Urgent Care Locations Clinic Hours Telephone Numbers     [facility]  [address] [address]     Monday-Friday:     11:00am - 9:00pm    Saturday-Sunday:  9:00am - 5:00pm   [phone]     [facility]  [address] [place] [address]     Monday-Friday:      5:00pm - 9:00pm     Saturday-Sunday:  9:00am - 5:00pm   [phone]

## 2019-08-08 NOTE — PROGRESS NOTES
History of Present Illness - Leela Dudley is a 89 year old female here to see me for the first time for dysphagia.  She is here with her daughter who gives the history as well.    They have had a video swallow with speech and swallow therapy consultation already, and the report was available to me.  Done on 10/9/19464 It showed normal oral phase with premature spillage of oral phase of the bolus over the base of tongue and extensive post swallow residue in the valleculae and pyriform sinuses.  There was some touching of the cords but no penetration.    She is chronic throat cleared and tends to choke and spit up a lot of saliva every meal.  She leaves in assisted living.    No previous ENT surgery or head and neck disease.  No previous stroke or any neurological disease.    Past Medical History -   Patient Active Problem List   Diagnosis     Osteopenia     OA (osteoarthritis) of knee     Gastroesophageal reflux disease without esophagitis     Essential hypertension with goal blood pressure less than 140/90     Advanced directives, counseling/discussion     Health Care Home     Vitamin D deficiency disease     Hyperlipidemia LDL goal <100     Fatigue     Choroidal nevus OD      Pseudophakia OU, with Yag OS     Major depressive disorder, single episode, mild (H)     Decubitus ulcer of coccygeal region, stage 2     Advance directive discussed with patient     History of acute renal failure     Diverticulitis of colon     Type 2 diabetes mellitus with diabetic mononeuropathy, without long-term current use of insulin (H)     Rotator cuff tear arthropathy of left shoulder       Current Medications -   Current Outpatient Medications:      ASPIRIN 81 MG OR TABS, 1 TABLET DAILY, Disp: , Rfl:      candesartan cilexetil 32 MG TABS, TAKE 1 TABLET BY MOUTH DAILY FOR BLOOD PRESSURE., Disp: 90 tablet, Rfl: 1     cloNIDine (CATAPRES-TTS1) 0.1 MG/24HR WK patch, PLACE 1 PATCH ONTO THE SKIN ONCE A WEEK FOR BLOOD PRESSURE., Disp:  13 patch, Rfl: 0     diltiazem (CARDIZEM CD; CARTIA XT) 360 MG 24 hr CD capsule, Take 1 capsule (360 mg) by mouth every evening for blood pressure. (Patient taking differently: Take 180 mg by mouth every evening for blood pressure.), Disp: 90 capsule, Rfl: 1     hydrochlorothiazide (HYDRODIURIL) 25 MG tablet, Take 1 tablet (25 mg) by mouth daily for blood pressure. (Patient not taking: Reported on 6/22/2018), Disp: 90 tablet, Rfl: 1     Multiple Vitamins-Minerals (PRESERVISION/LUTEIN PO), Take 1 chew tab by mouth daily, Disp: , Rfl:      order for DME, Equipment being ordered: Blood glucose meter (per insurance preference), with associated test solution, Disp: 1 Device, Rfl: 0     order for DME, Equipment being ordered: Glucose testing strips (per insurance preference). Test blood sugar one time daily., Disp: 100 strip, Rfl: 3     order for DME, Equipment being ordered: Lancets (per insurance preference). Test blood sugar one time daily., Disp: 100 Device, Rfl: 3     STATIN NOT PRESCRIBED, INTENTIONAL,, Intolerance to atorvastatin., Disp: , Rfl:     Allergies -   Allergies   Allergen Reactions     Amlodipine      Atorvastatin      Atorvastatin Calcium      Muscle pain     Codeine      Codeine Sulfate Nausea and Vomiting     sweats     Lisinopril      Norvasc [Amlodipine Besylate]      Urinary frequency     Zestril [Ace Inhibitors]      cough       Social History -   Social History     Socioeconomic History     Marital status:      Spouse name: Not on file     Number of children: Not on file     Years of education: Not on file     Highest education level: Not on file   Occupational History     Not on file   Social Needs     Financial resource strain: Not on file     Food insecurity:     Worry: Not on file     Inability: Not on file     Transportation needs:     Medical: Not on file     Non-medical: Not on file   Tobacco Use     Smoking status: Never Smoker     Smokeless tobacco: Never Used   Substance and  Sexual Activity     Alcohol use: No     Drug use: No     Sexual activity: Never   Lifestyle     Physical activity:     Days per week: Not on file     Minutes per session: Not on file     Stress: Not on file   Relationships     Social connections:     Talks on phone: Not on file     Gets together: Not on file     Attends Advent service: Not on file     Active member of club or organization: Not on file     Attends meetings of clubs or organizations: Not on file     Relationship status: Not on file     Intimate partner violence:     Fear of current or ex partner: Not on file     Emotionally abused: Not on file     Physically abused: Not on file     Forced sexual activity: Not on file   Other Topics Concern     Parent/sibling w/ CABG, MI or angioplasty before 65F 55M? Not Asked   Social History Narrative     Not on file       Family History -   Family History   Problem Relation Age of Onset     Macular Degeneration Sister      Thyroid Disease Son      Diabetes No family hx of        Review of Systems - As per HPI and PMHx, otherwise 10+ system review of the head and neck, and general constitution is negative.    Physical Exam  /66   Pulse 61   SpO2 98%     General - The patient is well nourished and well developed, and appears to have good nutritional status.  Alert and oriented to person and place, answers questions and cooperates with examination appropriately.   Head and Face - Normocephalic and atraumatic, with no gross asymmetry noted of the contour of the facial features.  The facial nerve is intact, with strong symmetric movements.  Voice and Breathing - The patient was breathing comfortably without the use of accessory muscles. There was no wheezing, stridor, or stertor.  The patients voice was clear and strong, and had appropriate pitch and quality.  Ears - The tympanic membranes are normal in appearance, bony landmarks are intact.  No retraction, perforation, or masses.  No fluid or purulence was  seen in the external canal or the middle ear. No evidence of infection of the middle ear or external canal, cerumen was normal in appearance.  Eyes - Extraocular movements intact, and the pupils were reactive to light.  Sclera were not icteric or injected, conjunctiva were pink and moist.  Mouth - Examination of the oral cavity showed pink, healthy oral mucosa. No lesions or ulcerations noted.  The tongue was mobile and midline, and the dentition were in good condition.    Throat - The walls of the oropharynx were smooth, pink, moist, symmetric, and had no lesions or ulcerations.  The tonsillar pillars and soft palate were symmetric.  The uvula was midline on elevation.    Neck - Normal midline excursion of the laryngotracheal complex during swallowing.  Full range of motion on passive movement.  Palpation of the occipital, submental, submandibular, internal jugular chain, and supraclavicular nodes did not demonstrate any abnormal lymph nodes or masses.  The carotid pulse was palpable bilaterally.  Palpation of the thyroid was soft and smooth, with no nodules or goiter appreciated.  The trachea was mobile and midline.  Nose - External contour is symmetric, no gross deflection or scars.  Nasal mucosa is pink and moist with no abnormal mucus.  The septum was midline and non-obstructive, turbinates of normal size and position.  No polyps, masses, or purulence noted on examination.    Procedure - Mirror Laryngoscopy  To further evaluate the throat, I performed mirror laryngoscopy.  After spraying with hurricane spray and grasping the tip of the oral tongue with a gauze pad to retract it forward, I used a laryngeal mirror to examine the hypopharynx and supraglottic larynx.  The base of tongue was symmetric and the vallecula was open.  The epiglottis was normal in appearance.  The pyriform sinuses were open bilaterally with no masses or pooling.  The aryepiglottic folds were smooth and symmetric. The false vocal cords  were smooth and symmetric. The true vocal folds cords were smooth and pearly white, with no polyps, ulcerations, or masses.  The larynx was not rotated, and the vocal cords moved symmetrically and met in the midline.       A/P - Leeal Dudley is a 89 year old female  (R13.12) Oropharyngeal dysphagia  (primary encounter diagnosis)  (R13.10) Swallowing disorder    We had a long conversation today, and thankfully she already had the video swallow study clearly shows a functional disorder of retained bolus.  Unfortunately there is no medication or surgery that fixes dyscoordination of swallowing, only swallow therapy and or change of food consistency.    They understand and are willing to try swallow therapy again.

## 2019-08-08 NOTE — LETTER
8/8/2019         RE: Leela Dudley  [address]  [place] [address]        Dear Colleague,    Thank you for referring your patient, Leela Dudley, to the [facility]. Please see a copy of my visit note below.    History of Present Illness - Leela Dudley is a 89 year old female here to see me for the first time for dysphagia.  She is here with her daughter who gives the history as well.    They have had a video swallow with speech and swallow therapy consultation already, and the report was available to me.  Done on 10/9/33738 It showed normal oral phase with premature spillage of oral phase of the bolus over the base of tongue and extensive post swallow residue in the valleculae and pyriform sinuses.  There was some touching of the cords but no penetration.    She is chronic throat cleared and tends to choke and spit up a lot of saliva every meal.  She leaves in assisted living.    No previous ENT surgery or head and neck disease.  No previous stroke or any neurological disease.    Past Medical History -   Patient Active Problem List   Diagnosis     Osteopenia     OA (osteoarthritis) of knee     Gastroesophageal reflux disease without esophagitis     Essential hypertension with goal blood pressure less than 140/90     Advanced directives, counseling/discussion     Health Care Home     Vitamin D deficiency disease     Hyperlipidemia LDL goal <100     Fatigue     Choroidal nevus OD      Pseudophakia OU, with Yag OS     Major depressive disorder, single episode, mild (H)     Decubitus ulcer of coccygeal region, stage 2     Advance directive discussed with patient     History of acute renal failure     Diverticulitis of colon     Type 2 diabetes mellitus with diabetic mononeuropathy, without long-term current use of insulin (H)     Rotator cuff tear arthropathy of left shoulder       Current Medications -   Current Outpatient Medications:      ASPIRIN 81 MG OR TABS, 1 TABLET  DAILY, Disp: , Rfl:      candesartan cilexetil 32 MG TABS, TAKE 1 TABLET BY MOUTH DAILY FOR BLOOD PRESSURE., Disp: 90 tablet, Rfl: 1     cloNIDine (CATAPRES-TTS1) 0.1 MG/24HR WK patch, PLACE 1 PATCH ONTO THE SKIN ONCE A WEEK FOR BLOOD PRESSURE., Disp: 13 patch, Rfl: 0     diltiazem (CARDIZEM CD; CARTIA XT) 360 MG 24 hr CD capsule, Take 1 capsule (360 mg) by mouth every evening for blood pressure. (Patient taking differently: Take 180 mg by mouth every evening for blood pressure.), Disp: 90 capsule, Rfl: 1     hydrochlorothiazide (HYDRODIURIL) 25 MG tablet, Take 1 tablet (25 mg) by mouth daily for blood pressure. (Patient not taking: Reported on 6/22/2018), Disp: 90 tablet, Rfl: 1     Multiple Vitamins-Minerals (PRESERVISION/LUTEIN PO), Take 1 chew tab by mouth daily, Disp: , Rfl:      order for DME, Equipment being ordered: Blood glucose meter (per insurance preference), with associated test solution, Disp: 1 Device, Rfl: 0     order for DME, Equipment being ordered: Glucose testing strips (per insurance preference). Test blood sugar one time daily., Disp: 100 strip, Rfl: 3     order for DME, Equipment being ordered: Lancets (per insurance preference). Test blood sugar one time daily., Disp: 100 Device, Rfl: 3     STATIN NOT PRESCRIBED, INTENTIONAL,, Intolerance to atorvastatin., Disp: , Rfl:     Allergies -   Allergies   Allergen Reactions     Amlodipine      Atorvastatin      Atorvastatin Calcium      Muscle pain     Codeine      Codeine Sulfate Nausea and Vomiting     sweats     Lisinopril      Norvasc [Amlodipine Besylate]      Urinary frequency     Zestril [Ace Inhibitors]      cough       Social History -   Social History     Socioeconomic History     Marital status:      Spouse name: Not on file     Number of children: Not on file     Years of education: Not on file     Highest education level: Not on file   Occupational History     Not on file   Social Needs     Financial resource strain: Not on file      Food insecurity:     Worry: Not on file     Inability: Not on file     Transportation needs:     Medical: Not on file     Non-medical: Not on file   Tobacco Use     Smoking status: Never Smoker     Smokeless tobacco: Never Used   Substance and Sexual Activity     Alcohol use: No     Drug use: No     Sexual activity: Never   Lifestyle     Physical activity:     Days per week: Not on file     Minutes per session: Not on file     Stress: Not on file   Relationships     Social connections:     Talks on phone: Not on file     Gets together: Not on file     Attends Baptism service: Not on file     Active member of club or organization: Not on file     Attends meetings of clubs or organizations: Not on file     Relationship status: Not on file     Intimate partner violence:     Fear of current or ex partner: Not on file     Emotionally abused: Not on file     Physically abused: Not on file     Forced sexual activity: Not on file   Other Topics Concern     Parent/sibling w/ CABG, MI or angioplasty before 65F 55M? Not Asked   Social History Narrative     Not on file       Family History -   Family History   Problem Relation Age of Onset     Macular Degeneration Sister      Thyroid Disease Son      Diabetes No family hx of        Review of Systems - As per HPI and PMHx, otherwise 10+ system review of the head and neck, and general constitution is negative.    Physical Exam  /66   Pulse 61   SpO2 98%     General - The patient is well nourished and well developed, and appears to have good nutritional status.  Alert and oriented to person and place, answers questions and cooperates with examination appropriately.   Head and Face - Normocephalic and atraumatic, with no gross asymmetry noted of the contour of the facial features.  The facial nerve is intact, with strong symmetric movements.  Voice and Breathing - The patient was breathing comfortably without the use of accessory muscles. There was no wheezing,  stridor, or stertor.  The patients voice was clear and strong, and had appropriate pitch and quality.  Ears - The tympanic membranes are normal in appearance, bony landmarks are intact.  No retraction, perforation, or masses.  No fluid or purulence was seen in the external canal or the middle ear. No evidence of infection of the middle ear or external canal, cerumen was normal in appearance.  Eyes - Extraocular movements intact, and the pupils were reactive to light.  Sclera were not icteric or injected, conjunctiva were pink and moist.  Mouth - Examination of the oral cavity showed pink, healthy oral mucosa. No lesions or ulcerations noted.  The tongue was mobile and midline, and the dentition were in good condition.    Throat - The walls of the oropharynx were smooth, pink, moist, symmetric, and had no lesions or ulcerations.  The tonsillar pillars and soft palate were symmetric.  The uvula was midline on elevation.    Neck - Normal midline excursion of the laryngotracheal complex during swallowing.  Full range of motion on passive movement.  Palpation of the occipital, submental, submandibular, internal jugular chain, and supraclavicular nodes did not demonstrate any abnormal lymph nodes or masses.  The carotid pulse was palpable bilaterally.  Palpation of the thyroid was soft and smooth, with no nodules or goiter appreciated.  The trachea was mobile and midline.  Nose - External contour is symmetric, no gross deflection or scars.  Nasal mucosa is pink and moist with no abnormal mucus.  The septum was midline and non-obstructive, turbinates of normal size and position.  No polyps, masses, or purulence noted on examination.    Procedure - Mirror Laryngoscopy  To further evaluate the throat, I performed mirror laryngoscopy.  After spraying with hurricane spray and grasping the tip of the oral tongue with a gauze pad to retract it forward, I used a laryngeal mirror to examine the hypopharynx and supraglottic larynx.   The base of tongue was symmetric and the vallecula was open.  The epiglottis was normal in appearance.  The pyriform sinuses were open bilaterally with no masses or pooling.  The aryepiglottic folds were smooth and symmetric. The false vocal cords were smooth and symmetric. The true vocal folds cords were smooth and pearly white, with no polyps, ulcerations, or masses.  The larynx was not rotated, and the vocal cords moved symmetrically and met in the midline.       A/P - Leela Dudley is a 89 year old female  (R13.12) Oropharyngeal dysphagia  (primary encounter diagnosis)  (R13.10) Swallowing disorder    We had a long conversation today, and thankfully she already had the video swallow study clearly shows a functional disorder of retained bolus.  Unfortunately there is no medication or surgery that fixes dyscoordination of swallowing, only swallow therapy and or change of food consistency.    They understand and are willing to try swallow therapy again.      Again, thank you for allowing me to participate in the care of your patient.        Sincerely,        Sean Wei MD

## 2019-08-12 ENCOUNTER — TELEPHONE (OUTPATIENT)
Dept: OTOLARYNGOLOGY | Facility: CLINIC | Age: 84
End: 2019-08-12

## 2019-08-15 ENCOUNTER — TELEPHONE (OUTPATIENT)
Dept: OTOLARYNGOLOGY | Facility: CLINIC | Age: 84
End: 2019-08-15

## 2019-08-15 NOTE — TELEPHONE ENCOUNTER
Reason for Call:  Other call back    Detailed comments:     Patients daughter, Dayan, devyn noticed there is no office notes on my chart regarding their last appointment and states a lot was discussed. She's wondering how she can obtain that information. Please call to advise.     Phone Number Patient can be reached at: [phone]    Best Time:any     Can we leave a detailed message on this number? YES    Call taken on 8/15/2019 at 2:51 PM by Sindy Lopes

## 2019-08-15 NOTE — TELEPHONE ENCOUNTER
Left detailed messaged on patient's daughter's voicemail informing her that the office visit note will not be visible it will only show the AVS. Provided number for medical records to obtain copy of office visit note if desired [phone]. Also gave RN's direct line [phone] if she should have questions.     Megan Weber RN

## 2019-08-22 ENCOUNTER — RECORDS - HEALTHEAST (OUTPATIENT)
Dept: LAB | Facility: CLINIC | Age: 84
End: 2019-08-22

## 2019-08-25 LAB — HBA1C MFR BLD: 7 % (ref 4.2–6.1)

## 2019-11-01 ENCOUNTER — RECORDS - HEALTHEAST (OUTPATIENT)
Dept: LAB | Facility: CLINIC | Age: 84
End: 2019-11-01

## 2019-11-01 LAB
ANION GAP SERPL CALCULATED.3IONS-SCNC: 7 MMOL/L (ref 5–18)
BUN SERPL-MCNC: 17 MG/DL (ref 8–28)
CALCIUM SERPL-MCNC: 9.2 MG/DL (ref 8.5–10.5)
CHLORIDE BLD-SCNC: 105 MMOL/L (ref 98–107)
CO2 SERPL-SCNC: 27 MMOL/L (ref 22–31)
CREAT SERPL-MCNC: 0.74 MG/DL (ref 0.6–1.1)
ERYTHROCYTE [DISTWIDTH] IN BLOOD BY AUTOMATED COUNT: 14.9 % (ref 11–14.5)
GFR SERPL CREATININE-BSD FRML MDRD: >60 ML/MIN/1.73M2
GLUCOSE BLD-MCNC: 206 MG/DL (ref 70–125)
HCT VFR BLD AUTO: 39.1 % (ref 35–47)
HGB BLD-MCNC: 12.5 G/DL (ref 12–16)
MCH RBC QN AUTO: 28.3 PG (ref 27–34)
MCHC RBC AUTO-ENTMCNC: 32 G/DL (ref 32–36)
MCV RBC AUTO: 89 FL (ref 80–100)
PLATELET # BLD AUTO: 236 THOU/UL (ref 140–440)
PMV BLD AUTO: 9.9 FL (ref 8.5–12.5)
POTASSIUM BLD-SCNC: 4.1 MMOL/L (ref 3.5–5)
RBC # BLD AUTO: 4.41 MILL/UL (ref 3.8–5.4)
SODIUM SERPL-SCNC: 139 MMOL/L (ref 136–145)
WBC: 6 THOU/UL (ref 4–11)

## 2019-11-15 NOTE — TELEPHONE ENCOUNTER
"Requested Prescriptions   Pending Prescriptions Disp Refills     diltiazem ER COATED BEADS (CARDIZEM CD/CARTIA XT) 120 MG 24 hr capsule [Pharmacy Med Name: DILTIAZEM 120MG ER CAPS]  11     Sig: TAKE 1 CAPSULE BY MOUTH ONCE DAILY          Last Written Prescription Date:  1/9/18  Last Fill Quantity: 90,  # refills: 1   Last Office Visit with [facility], [facility] or [facility] prescribing provider:  6/22/18   Future Office Visit:           Calcium Channel Blockers Protocol  Failed - 11/15/2019  5:05 PM        Failed - Normal ALT in past 12 months     Recent Labs   Lab Test 06/22/18  1401   ALT 13             Failed - Recent (12 mo) or future (30 days) visit within the authorizing provider's specialty     Patient has had an office visit with the authorizing provider or a provider within the authorizing providers department within the previous 12 mos or has a future within next 30 days. See \"Patient Info\" tab in inbasket, or \"Choose Columns\" in Meds & Orders section of the refill encounter.              Failed - Normal serum creatinine on file in past 12 months     Recent Labs   Lab Test 09/20/18   CR 0.68             Passed - Blood pressure under 140/90 in past 12 months     BP Readings from Last 3 Encounters:   08/08/19 131/66   06/22/18 138/61   02/08/18 121/72                 Passed - Medication is active on med list        Passed - Patient is age 18 or older        Passed - No active pregnancy on record        Passed - No positive pregnancy test in past 12 months              Bhanu Faarax  [facility]  "

## 2019-11-19 NOTE — TELEPHONE ENCOUNTER
Routing refill request to provider for review/approval because:  Labs not current:  ALT and creatinine.  Patient also has not been seen in clinic since 06/22/2018    Mabel Schmidt RN

## 2019-11-20 RX ORDER — DILTIAZEM HYDROCHLORIDE 120 MG/1
CAPSULE, COATED, EXTENDED RELEASE ORAL
Refills: 11 | OUTPATIENT
Start: 2019-11-20

## 2020-02-24 ENCOUNTER — HEALTH MAINTENANCE LETTER (OUTPATIENT)
Age: 85
End: 2020-02-24

## 2020-07-01 ENCOUNTER — RECORDS - HEALTHEAST (OUTPATIENT)
Dept: LAB | Facility: CLINIC | Age: 85
End: 2020-07-01

## 2020-07-01 LAB
ANION GAP SERPL CALCULATED.3IONS-SCNC: 9 MMOL/L (ref 5–18)
BUN SERPL-MCNC: 18 MG/DL (ref 8–28)
CALCIUM SERPL-MCNC: 9.5 MG/DL (ref 8.5–10.5)
CHLORIDE BLD-SCNC: 106 MMOL/L (ref 98–107)
CO2 SERPL-SCNC: 27 MMOL/L (ref 22–31)
CREAT SERPL-MCNC: 0.74 MG/DL (ref 0.6–1.1)
ERYTHROCYTE [DISTWIDTH] IN BLOOD BY AUTOMATED COUNT: 14.4 % (ref 11–14.5)
GFR SERPL CREATININE-BSD FRML MDRD: >60 ML/MIN/1.73M2
GLUCOSE BLD-MCNC: 194 MG/DL (ref 70–125)
HBA1C MFR BLD: 6.5 %
HCT VFR BLD AUTO: 37.4 % (ref 35–47)
HGB BLD-MCNC: 12 G/DL (ref 12–16)
MCH RBC QN AUTO: 29.1 PG (ref 27–34)
MCHC RBC AUTO-ENTMCNC: 32.1 G/DL (ref 32–36)
MCV RBC AUTO: 91 FL (ref 80–100)
PLATELET # BLD AUTO: 283 THOU/UL (ref 140–440)
PMV BLD AUTO: 10.1 FL (ref 8.5–12.5)
POTASSIUM BLD-SCNC: 3.6 MMOL/L (ref 3.5–5)
RBC # BLD AUTO: 4.13 MILL/UL (ref 3.8–5.4)
SODIUM SERPL-SCNC: 142 MMOL/L (ref 136–145)
WBC: 9.7 THOU/UL (ref 4–11)

## 2020-07-02 LAB — 25(OH)D3 SERPL-MCNC: 44.2 NG/ML (ref 30–80)

## 2020-12-13 ENCOUNTER — HEALTH MAINTENANCE LETTER (OUTPATIENT)
Age: 85
End: 2020-12-13

## 2021-06-02 VITALS — BODY MASS INDEX: 20.88 KG/M2 | WEIGHT: 110.5 LBS

## 2021-06-02 VITALS — WEIGHT: 121.4 LBS | BODY MASS INDEX: 22.94 KG/M2

## 2021-06-20 NOTE — PROGRESS NOTES
[facility]    DATE: 10/1/2018    NAME:  Leela Dudley             :  1926  MRN: [id number]  CODE STATUS:  DNR/DNI    FACILITY:  [facility] [[id number]]       ROOM:   253    CHIEF COMPLAINT/REASON FOR VISIT:  Chief Complaint   Patient presents with     Problem Visit     Right knee pain     HISTORY OF PRESENT ILLNESS: Leela Dudley is a 89 y.o. female with Arthritis, Hypertension, and on Aspirin who presented to the emergency department via EMS transport for evaluation of a fall. Per EMS, the patient was at her home where she lives alone when she fell and hit her head on a coffee table. They state that she complains of chronicf knee pain. She also reported some right hip pain. She reported that she did not lose consciousness, she is not diabetic, has no heart issues, back pain, or able to walk around outside. Of note, the patient uses a walker or cane at home. Small scalp laceration. Radiography negative as above. Patient demonstrated inability to get out of bed due to severe bilateral leg pain and potentially some right hip tenderness. Due to this, XR of pelvis and right hip has been ordered which revealed a pelvic fracture. She has a history of severe arthritic knee pain and chronic leg pains but the fall has triggered significant exacerbation. She is unable to ambulate (or get out from bed) at this point due to pain.  She was transferred to TCU for continue rehabilitation.    Today, she reports improvement in pelvic pain but does have some pain in her right knee.  She rates her pain a 4/10 and she notices it more when she applies weight on it.  Currently not using her prn Tramadol.  Reports some sleep disturbance but denies any medication at this time.    Past Medical History:   Diagnosis Date     Anxiety      ARF (acute renal failure) (H)      Arthritis      Cataract     cortical senile cataract. left      Chronic knee pain      Closed fracture     multiple pubic  rami, left initial encounter     Clubbing of fingers      Depression      Diarrhea      Diverticulitis of colon      Dizziness      Dysphagia      Elevated troponin 06/18/2015     Fall     struck head on coffee table with no LOC     Fracture     closed fx carpel bone 1984. right wrist     HTN (hypertension)      Hyperglycemia      Hyponatremia      Impaired gait      Joint pain      Light headed      Lumbago      Migraine      NSTEMI (non-ST elevated myocardial infarction) (H)     suspected     Osteoarthrosis      Rhabdomyolysis     traumatic     Scalp contusion     post fall     Symphysis pubis disruption      Type 2 diabetes mellitus (H)      UTI (urinary tract infection)      Weakness      Past Surgical History:   Procedure Laterality Date     BREAST LUMPECTOMY  1955     CATARACT EXTRACTION Left      CHOLECYSTECTOMY  1998     TONSILLECTOMY  1945     Family History   Problem Relation Age of Onset     Hypertension Mother      enlarged heart     Cancer Father      leukemia     Social History     Social History     Marital status:      Spouse name: N/A     Number of children: N/A     Years of education: N/A     Occupational History     Not on file.     Social History Main Topics     Smoking status: Never Smoker     Smokeless tobacco: Never Used     Alcohol use No     Drug use: No     Sexual activity: Not on file     Other Topics Concern     Not on file     Social History Narrative     Allergies   Allergen Reactions     Atorvastatin      Codeine      Norvasc [Amlodipine]      Zestril [Lisinopril]      Current Outpatient Prescriptions   Medication Sig Dispense Refill     acetaminophen (TYLENOL) 500 MG tablet Take 1,000 mg by mouth 3 (three) times a day.       aspirin 81 MG EC tablet Take 81 mg by mouth daily.       calcitonin, salmon, (MIACALCIN) 200 unit/actuation nasal spray Apply 1 spray into alternating nostrils daily.       candesartan (ATACAND) 32 MG tablet Take 32 mg by mouth daily.       celecoxib  (CELEBREX) 200 MG capsule Take 200 mg by mouth 2 (two) times a day.       cloNIDine (CATAPRES-TTS) 0.1 mg/24 hr Place 1 patch on the skin once a week. Every Monday       diltiazem (DILACOR XR) 180 MG 24 hr capsule Take 180 mg by mouth daily.       lidocaine (LIDODERM) 5 % Place 1 patch on the skin daily. Remove & Discard patch within 12 hours or as directed by MD       lutein-zeaxanthin 25-5 mg cap Take 1 capsule by mouth daily.       traMADol (ULTRAM) 50 mg tablet Take 25 mg by mouth every 4 (four) hours as needed for pain.        No current facility-administered medications for this visit.        REVIEW OF SYSTEMS:    Currently, no fever, chills, or rigors. Does not have any visual or hearing problems. Denies any chest pain, headaches, palpitations, lightheadedness, dizziness, shortness of breath, or cough. Appetite is good. Denies any GERD symptoms. Denies any difficulty with swallowing, nausea, or vomiting.  Denies any abdominal pain, diarrhea or constipation. Denies any urinary symptoms. No insomnia. No active bleeding. No rash.       PHYSICAL EXAMINATION:  Vitals:    10/01/18 1147   BP: 131/77   Pulse: 86   Resp: 16   Temp: 97.3  F (36.3  C)   SpO2: 95%   Weight: 110 lb 8 oz (50.1 kg)       GENERAL: Awake, Alert, oriented x3, not in any form of acute distress, answers questions appropriately, follows simple commands, conversant  HEENT: Head is normocephalic with normal hair distribution. No evidence of trauma. Ears: No acute purulent discharge. Eyes: Conjunctivae pink with no scleral jaundice. Nose: Normal mucosa and septum. NECK: Supple with no cervical or supraclavicular lymphadenopathy. Trachea is midline.   CHEST: No tenderness or deformity, no crepitus  LUNG: Clear to auscultation with good chest expansion. There are no crackles or wheezes, normal AP diameter.  BACK: No kyphosis of the thoracic spine. Symmetric, no curvature, ROM normal, no CVA tenderness, no spinal tenderness   CVS: There is good S1  S2,  there are no murmurs, rubs, gallops, or heaves, rhythm is regular,  2+ pulses symmetric in all extremities.  ABDOMEN: Globular and soft, nontender to palpation, non distended, no masses, no organomegaly, good bowel sounds, no rebound or guarding, no peritoneal signs.   EXTREMITIES:  Full range of motion on both upper and lower extremities, there is no tenderness to palpation, no pedal edema, no cyanosis or clubbing, no calf tenderness.  Pulses equal in all extremities, normal cap refill, no joint swelling.  SKIN: Warm and dry, no erythema noted.  Skin color, texture, no rashes or lesions.  NEUROLOGICAL: The patient is oriented to person, place and time. Strength and sensation are grossly intact. Face is symmetric.    LABS:      Lab Results   Component Value Date    WBC 7.9 10/01/2018    HGB 11.0 (L) 10/01/2018    HCT 34.7 (L) 10/01/2018    MCV 96 10/01/2018     10/01/2018     Results for orders placed or performed in visit on 10/01/18   Basic Metabolic Panel   Result Value Ref Range    Sodium 141 136 - 145 mmol/L    Potassium 4.4 3.5 - 5.0 mmol/L    Chloride 111 (H) 98 - 107 mmol/L    CO2 26 22 - 31 mmol/L    Anion Gap, Calculation 4 (L) 5 - 18 mmol/L    Glucose 138 (H) 70 - 125 mg/dL    Calcium 8.8 8.5 - 10.5 mg/dL    BUN 26 8 - 28 mg/dL    Creatinine 0.67 0.60 - 1.10 mg/dL    GFR MDRD Af Amer >60 >60 mL/min/1.73m2    GFR MDRD Non Af Amer >60 >60 mL/min/1.73m2       ASSESSMENT/PLAN:    1. Closed nondisplaced fracture of pelvis with routine healing, unspecified part of pelvis, subsequent encounter  - Followed by Ortho and PT/OT.  Continue Tylenol, Lidocaine patch, Celebrex, Calcitonin spray, and prn Tramadol   2. Primary osteoarthritis involving multiple joints - See above   3. Fall, subsequent encounter -No recent fall activity   4. Pain management - Continue Tylenol,  Lidocaine patch, Celebrex, Calcitonin spray, and prn Tramadol.  Continue Calcium with Vitamin D   5. Essential hypertension  - Blood  pressures are within target range, will continue Atacand, Clonidine, and Diltiazem       Electronically signed by:  Christie Talamantes CNP    35 minutes TT of which 50% was spent in counseling and coordination of care of the above plan.    Time spent in interview and examination of patient, review of available records, and discussion with nursing staff. Continue care plan, efforts at therapy, and monitor nutritional status.

## 2021-06-20 NOTE — PROGRESS NOTES
"[facility]    Facility:   [facility] [[id number]]   Code Status: DNR/DNI      CHIEF COMPLAINT/REASON FOR VISIT:  Chief Complaint   Patient presents with     Problem Visit     Dysphagia       HISTORY:      HPI: Leela is a 89 y.o. female with Arthritis, Hypertension, and on Aspirin who presented to the emergency department via EMS transport for evaluation of a fall. Per EMS, the patient was at her home where she lives alone when she fell and hit her head on a coffee table. They state that she complains of chronicf knee pain. She also reported some right hip pain. She reported that she did not lose consciousness, she is not diabetic, has no heart issues, back pain, or able to walk around outside. Of note, the patient uses a walker or cane at home. Small scalp laceration. Radiography negative as above. Patient demonstrated inability to get out of bed due to severe bilateral leg pain and potentially some right hip tenderness. Due to this, XR of pelvis and right hip has been ordered which revealed a pelvic fracture. She has a history of severe arthritic knee pain and chronic leg pains but the fall has triggered significant exacerbation. She is unable to ambulate (or get out from bed) at this point due to pain.  She was transferred to TCU for continue rehabilitation.    Today, patient was seen in her room while sitting in her recliner.  Patient reports that she was in no pain at that time and that her pain management has improved.  She denies chest pain, shortness of breath, dizziness.  She also denies difficulty with bowel movements, abdominal pain, and difficulty with urination.  She denies numbness and tingling. She reports that her appetite is just \"okay\" and it has been about the same since she has been in the facility. She has some situational Depression and was started on  Zoloft last week.    Past Medical History:   Diagnosis Date     Anxiety      ARF (acute renal failure) (H)      " Arthritis      Cataract     cortical senile cataract. left      Chronic knee pain      Closed fracture     multiple pubic rami, left initial encounter     Clubbing of fingers      Depression      Diarrhea      Diverticulitis of colon      Dizziness      Dysphagia      Elevated troponin 06/18/2015     Fall     struck head on coffee table with no LOC     Fracture     closed fx carpel bone 1984. right wrist     HTN (hypertension)      Hyperglycemia      Hyponatremia      Impaired gait      Joint pain      Light headed      Lumbago      Migraine      NSTEMI (non-ST elevated myocardial infarction) (H)     suspected     Osteoarthrosis      Rhabdomyolysis     traumatic     Scalp contusion     post fall     Symphysis pubis disruption      Type 2 diabetes mellitus (H)      UTI (urinary tract infection)      Weakness      Past Surgical History:   Procedure Laterality Date     BREAST LUMPECTOMY  1955     CATARACT EXTRACTION Left      CHOLECYSTECTOMY  1998     TONSILLECTOMY  1945            Family History   Problem Relation Age of Onset     Hypertension Mother      enlarged heart     Cancer Father      leukemia     Social History     Social History     Marital status:      Spouse name: N/A     Number of children: N/A     Years of education: N/A     Social History Main Topics     Smoking status: Never Smoker     Smokeless tobacco: Never Used     Alcohol use No     Drug use: No     Sexual activity: Not on file     Other Topics Concern     Not on file     Social History Narrative     Allergies   Allergen Reactions     Atorvastatin      Codeine      Norvasc [Amlodipine]      Zestril [Lisinopril]      Current Outpatient Prescriptions on File Prior to Visit   Medication Sig Dispense Refill     acetaminophen (TYLENOL) 500 MG tablet Take 1,000 mg by mouth 3 (three) times a day.       aspirin 81 MG EC tablet Take 81 mg by mouth daily.       calcitonin, salmon, (MIACALCIN) 200 unit/actuation nasal spray Apply 1 spray into  alternating nostrils daily.       candesartan (ATACAND) 32 MG tablet Take 32 mg by mouth daily.       celecoxib (CELEBREX) 200 MG capsule Take 200 mg by mouth 2 (two) times a day.       cloNIDine (CATAPRES-TTS) 0.1 mg/24 hr Place 1 patch on the skin once a week. Every Monday       diltiazem (DILACOR XR) 180 MG 24 hr capsule Take 180 mg by mouth daily.       lidocaine (LIDODERM) 5 % Place 1 patch on the skin daily. Remove & Discard patch within 12 hours or as directed by MD       lutein-zeaxanthin 25-5 mg cap Take 1 capsule by mouth daily.       sertraline (ZOLOFT) 50 MG tablet Take 50 mg by mouth daily.       traMADol (ULTRAM) 50 mg tablet Take 25 mg by mouth every 4 (four) hours as needed for pain.        No current facility-administered medications on file prior to visit.        Review of Systems   Constitutional: Negative for appetite change, chills and fever.   Respiratory: Negative for chest tightness and shortness of breath.    Cardiovascular: Negative for chest pain, palpitations and leg swelling.   Gastrointestinal: Negative for abdominal pain, constipation, diarrhea, nausea and vomiting.   Genitourinary: Negative for difficulty urinating.   Musculoskeletal:        Negative for pain.   Neurological: Negative for dizziness and numbness.       .  Vitals:    10/10/18 2205   BP: 126/68   Pulse: 82   Resp: 16   Temp: 98.2  F (36.8  C)   SpO2: 99%   Weight: 110 lb 8 oz (50.1 kg)       Physical Exam   Constitutional: She is oriented to person, place, and time. She appears well-developed. No distress.   HENT:   Head: Normocephalic.   Right Ear: External ear normal.   Left Ear: External ear normal.   Eyes: Conjunctivae are normal. Right eye exhibits no discharge. Left eye exhibits no discharge.   Cardiovascular: Normal rate, regular rhythm, normal heart sounds and intact distal pulses.  Exam reveals no friction rub.    No murmur heard.  Pulmonary/Chest: Effort normal and breath sounds normal. No respiratory distress.    Musculoskeletal: Normal range of motion. She exhibits no edema or tenderness.   Neurological: She is alert and oriented to person, place, and time. She has normal strength.   Skin: Skin is warm and dry. No rash noted. She is not diaphoretic. No erythema.   Psychiatric: She has a normal mood and affect. Her speech is normal and behavior is normal.         LABS:     Lab Results   Component Value Date    WBC 7.9 10/01/2018    HGB 11.0 (L) 10/01/2018    HCT 34.7 (L) 10/01/2018    MCV 96 10/01/2018     10/01/2018     Results for orders placed or performed in visit on 10/01/18   Basic Metabolic Panel   Result Value Ref Range    Sodium 141 136 - 145 mmol/L    Potassium 4.4 3.5 - 5.0 mmol/L    Chloride 111 (H) 98 - 107 mmol/L    CO2 26 22 - 31 mmol/L    Anion Gap, Calculation 4 (L) 5 - 18 mmol/L    Glucose 138 (H) 70 - 125 mg/dL    Calcium 8.8 8.5 - 10.5 mg/dL    BUN 26 8 - 28 mg/dL    Creatinine 0.67 0.60 - 1.10 mg/dL    GFR MDRD Af Amer >60 >60 mL/min/1.73m2    GFR MDRD Non Af Amer >60 >60 mL/min/1.73m2     ASSESSMENT/ PLAN:        ICD-10-CM    1. Closed nondisplaced fracture of pelvis with routine healing, unspecified part of pelvis, subsequent encounter S32.9XXD Continue pain management with Tramadol, Calcitonin spray, Tylenol, Lidocaine patch and Celebrex.  Continue with PT/OT.   2. Primary osteoarthritis involving multiple joints M15.0 Stable continue Tylenol   3. Essential hypertension I10 Blood pressures are within target range, will continue Catapres    4. Pain management R52 Continue current pain medication regimen   5. Dysphagia  Followed by Speech on puréed and nectar thick is the recommendation. Video swallow on 10/9/2018   6. Situational Depression   Recently started on Zoloft last week, will continue to monitor closely             Em DE LEON am scribing in the presence of, Christie Talamantes CNP  .    Christie DE LEON CNP, personally performed the services described in this  documentation, as scribed by Em Oshea in my presence, and it is both accurate and complete.        Electronically signed by: Christie Talamantes CNP

## 2021-06-20 NOTE — PROGRESS NOTES
[facility]    Facility:   [facility] [[id number]]   Code Status: DNR/DNI      CHIEF COMPLAINT/REASON FOR VISIT:  Chief Complaint   Patient presents with     Review Of Multiple Medical Conditions       HISTORY:      HPI: Leela is a 89 y.o. female who fell and was evaluated at the hospital.  She had a scalp laceration.  She had no loss of consciousness.  She struck her head on the coffee table.  She was complaining of pain in her knees, this was evaluated and there was no fracture discovered.  During her hospitalization she had x-ray of her pelvis and fracture was determined.  She was having pain with ambulation.  Other medical conditions include hypertension and osteoarthritis.  It appears from records that she has had a non-STEMI.  She also has had diverticulitis of the colon, history of acute renal failure, cataract with subsequent extraction and lens implant, and dysphagia.     Upon current review of systems, her nurse is concerned about pain management.  However in my discussions with her daughter Dayan in the past, there has been concern about medications causing too much drowsiness.  She also has had weight loss from 121 pounds to 107 pounds since she has been in transitional care unit.  She is not experiencing any problems now with fevers or chills nor sore throat nor cough nor shortness of breath or chest pain or palpitations of the heart nor abdominal pain or nausea.    Today is also a face-to-face encounter regarding her inability to do activities of daily living.  She has been evaluated by physical therapy and her medical diagnosis of pelvic fracture and osteoarthritis of the knees limit her mobility.  Mobility related activities of daily living that are affected would include bathing, food prep, grooming, and dressing.  A wheelchair is suitable and necessary for use in the home.  She does not have strength or stability to use a cane or walker.  Patient has not expressed  unwillingness to use the wheelchair in the home.  She has the physical and cognitive abilities to maneuver the equipment and also has a caregiver who is available, willing, and able to provide assistance.    Past Medical History:   Diagnosis Date     Anxiety      ARF (acute renal failure) (H)      Arthritis      Cataract     cortical senile cataract. left      Chronic knee pain      Closed fracture     multiple pubic rami, left initial encounter     Clubbing of fingers      Depression      Diarrhea      Diverticulitis of colon      Dizziness      Dysphagia      Elevated troponin 06/18/2015     Fall     struck head on coffee table with no LOC     Fracture     closed fx carpel bone 1984. right wrist     HTN (hypertension)      Hyperglycemia      Hyponatremia      Impaired gait      Joint pain      Light headed      Lumbago      Migraine      NSTEMI (non-ST elevated myocardial infarction) (H)     suspected     Osteoarthrosis      Rhabdomyolysis     traumatic     Scalp contusion     post fall     Symphysis pubis disruption      Type 2 diabetes mellitus (H)      UTI (urinary tract infection)      Weakness              Family History   Problem Relation Age of Onset     Hypertension Mother      enlarged heart     Cancer Father      leukemia     Social History     Social History     Marital status:      Spouse name: N/A     Number of children: N/A     Years of education: N/A     Social History Main Topics     Smoking status: Never Smoker     Smokeless tobacco: Never Used     Alcohol use No     Drug use: No     Sexual activity: Not on file     Other Topics Concern     Not on file     Social History Narrative         Review of Systems    .  Vitals:    09/27/18 1633   BP: 121/71   Pulse: 87   Resp: 20   Temp: 97.7  F (36.5  C)   SpO2: 99%       Physical Exam   Constitutional: No distress.   Eyes: Right eye exhibits no discharge. Left eye exhibits no discharge.   Neck: No JVD present.   Cardiovascular: Normal heart  sounds.    Pulmonary/Chest: Breath sounds normal. No respiratory distress.   Abdominal: Bowel sounds are normal. There is no tenderness.   Musculoskeletal: She exhibits no edema.   Neurological: She is alert.   Psychiatric: She has a normal mood and affect.   Nursing note and vitals reviewed.        LABS:   No new laboratory testing    ASSESSMENT:      ICD-10-CM    1. Closed nondisplaced fracture of pelvis with routine healing, unspecified part of pelvis, subsequent encounter S32.9XXD    2. Primary osteoarthritis involving multiple joints M15.0    3. Fall, subsequent encounter W19.XXXD    4. Pain management R52    5. Bilateral knee pain M25.561     M25.562        PLAN:    Clinoril will be discontinued.  Maximum dose of Celebrex will be used to try to minimize any risk of stomach upset but still be safe in that regard and yet give maximum pain benefit.  She will continue with the Tylenol 650 mg every 4 hours as needed for pain.  She will also continue with the tramadol 50 mg half tablet every 4 hours as needed for pain.  Nutritional supplement twice daily.  Dietary consultation.  I tried calling her daughter and left a message regarding these plans.      A standard 16 inch x 16 inch wheelchair with standard foot rest and full-length armrest is recommended for her.    Electronically signed by: Praveen Bolanos MD

## 2021-06-20 NOTE — PROGRESS NOTES
[facility]      Facility:    [facility] [[id number]]  Code Status: full, unconfirmed      Chief Complaint/Reason for Visit:  Chief Complaint   Patient presents with     H & P       HPI:   Leela is a 89 y.o. female who fell and was evaluated at the hospital.  She had a scalp laceration.  She had no loss of consciousness.  She struck her head on the coffee table.  She was complaining of pain in her knees, this was evaluated and there was no fracture discovered.  During her hospitalization she had x-ray of her pelvis and fracture was determined.  She was having pain with ambulation.  Other medical conditions include hypertension and osteoarthritis.  It appears from records that she has had a non-STEMI.  She also has had diverticulitis of the colon, history of acute renal failure, cataract with subsequent extraction and lens implant, and dysphagia.    Upon current review of systems, she has had significant coughing and difficulty swallowing.  She has not had shortness of breath.  There is no productive cough.  She does not have fevers or chills.  She does not have sore throat or nasal congestion.  She does not have chest pain or palpitations of the heart.  She has had constipation.  It was thought initially that this was related to oxycodone, and yet she arrived from the hospital without any opioid.  She has just been using lidocaine patch and Tylenol.  She has not had dysuria.  He does not have abdominal pain or nausea.    Past Medical History:  Past Medical History:   Diagnosis Date     Anxiety      ARF (acute renal failure) (H)      Arthritis      Cataract     cortical senile cataract. left      Chronic knee pain      Closed fracture     multiple pubic rami, left initial encounter     Clubbing of fingers      Depression      Diarrhea      Diverticulitis of colon      Dizziness      Dysphagia      Elevated troponin 06/18/2015     Fall     struck head on coffee table with no LOC      Fracture     closed fx carpel bone 1984. right wrist     HTN (hypertension)      Hyperglycemia      Hyponatremia      Impaired gait      Joint pain      Light headed      Lumbago      Migraine      NSTEMI (non-ST elevated myocardial infarction) (H)     suspected     Osteoarthrosis      Rhabdomyolysis     traumatic     Scalp contusion     post fall     Symphysis pubis disruption      Type 2 diabetes mellitus (H)      UTI (urinary tract infection)      Weakness            Surgical History:  Past Surgical History:   Procedure Laterality Date     BREAST LUMPECTOMY  1955     CATARACT EXTRACTION Left      CHOLECYSTECTOMY  1998     TONSILLECTOMY  1945       Family History:   Family History   Problem Relation Age of Onset     Hypertension Mother      enlarged heart     Cancer Father      leukemia       Social History:    Social History     Social History     Marital status:      Spouse name: N/A     Number of children: N/A     Years of education: N/A     Social History Main Topics     Smoking status: Never Smoker     Smokeless tobacco: Never Used     Alcohol use No     Drug use: No     Sexual activity: Not on file     Other Topics Concern     Not on file     Social History Narrative          Review of Systems   All other systems reviewed and are negative.      Vitals:    09/13/18 2052   BP: (!) 138/92   Pulse: 75   Resp: 18   Temp: 98.1  F (36.7  C)   SpO2: 96%       Physical Exam   Constitutional: No distress.   HENT:   Mouth/Throat: Oropharynx is clear and moist.   Eyes: Right eye exhibits no discharge. Left eye exhibits no discharge.   Neck: No JVD present. No thyromegaly present.   Cardiovascular: Normal rate, regular rhythm and normal heart sounds.    Pulmonary/Chest: Breath sounds normal. No respiratory distress.   Abdominal: Soft. Bowel sounds are normal. She exhibits no distension. There is no tenderness.   Musculoskeletal: She exhibits no edema.   Lymphadenopathy:     She has no cervical adenopathy.    Neurological: She is alert.   Skin: Skin is warm and dry.   Psychiatric: She has a normal mood and affect.   Nursing note and vitals reviewed.      Medication List:  Current Outpatient Prescriptions   Medication Sig     sulindac (CLINORIL) 150 MG tablet Take 150 mg by mouth 2 (two) times a day.     traMADol (ULTRAM) 50 mg tablet Take 25-50 mg by mouth every 4 (four) hours as needed for pain.     acetaminophen (TYLENOL) 325 MG tablet Take 650 mg by mouth every 4 (four) hours as needed for pain.     aspirin 81 MG EC tablet Take 81 mg by mouth daily.     candesartan (ATACAND) 32 MG tablet Take 32 mg by mouth daily.     cloNIDine (CATAPRES-TTS) 0.1 mg/24 hr Place 1 patch on the skin once a week. Every Sunday     diltiazem (DILACOR XR) 180 MG 24 hr capsule Take 180 mg by mouth daily.     lidocaine (LIDODERM) 5 % Place 1 patch on the skin daily. Remove & Discard patch within 12 hours or as directed by MD     lutein-zeaxanthin 25-5 mg cap Take 1 capsule by mouth daily.       Labs:  No new laboratory testing    Assessment:    ICD-10-CM    1. Closed nondisplaced fracture of pelvis with routine healing, unspecified part of pelvis, subsequent encounter S32.9XXD    2. Oropharyngeal dysphagia R13.12    3. Fall, subsequent encounter W19.XXXD    4. Essential hypertension I10    5. Primary osteoarthritis involving multiple joints M15.0        Plan:  I explained to she and her daughter who is present that she has had a pelvic fracture and that she can do as much activity as the pain allows and under the direction of the departments.  Her daughter has had sensitivities to opioids and would like to avoid this for her mother.  Daughter has had good success with Clinoril for arthritis pain and is open to her mother using this for the pain from the pelvic fracture as well as arthritis pain.  I talked about risks versus benefits of this type of medicine including effect upon the kidneys and risk of bleeding and potential risk to the  liver.  I also wrote for tramadol 50 mg half tablet for pain of 4-6 and full tablet for pain 7-10 every 4 hours as needed.  Offered medication for constipation and she prefers to use prunes and prune juice.  BMP, LFTs, CBC to be checked in 1 week.  Speech therapy consultation regarding dysphagia.      Electronically signed by: Praveen Bolanos MD

## 2021-06-20 NOTE — PROGRESS NOTES
[facility]    DATE: 2018    NAME:  Leela Dudley             :  1926  MRN: [id number]  CODE STATUS:  DNR/DNI    FACILITY:  [facility] [[id number]]       ROOM:   216    CHIEF COMPLAINT/REASON FOR VISIT:  Chief Complaint   Patient presents with     Problem Visit     Pelvic fracture     HISTORY OF PRESENT ILLNESS: Leela Dudley is a 89 y.o. female with Arthritis, Hypertension, and on Aspirin who presented to the emergency department via EMS transport for evaluation of a fall. Per EMS, the patient was at her home where she lives alone when she fell and hit her head on a coffee table. They state that she complains of chronicf knee pain. She also reported some right hip pain. She reported that she did not lose consciousness, she is not diabetic, has no heart issues, back pain, or able to walk around outside. Of note, the patient uses a walker or cane at home. Small scalp laceration. Radiography negative as above. Patient demonstrated inability to get out of bed due to severe bilateral leg pain and potentially some right hip tenderness. Due to this, XR of pelvis and right hip has been ordered which revealed a pelvic fracture. She has a history of severe arthritic knee pain and chronic leg pains but the fall has triggered significant exacerbation. She is unable to ambulate (or get out from bed) at this point due to pain.  She was transferred to TCU for continue rehabilitation.    Past Medical History:   Diagnosis Date     Anxiety      ARF (acute renal failure) (H)      Arthritis      Cataract     cortical senile cataract. left      Chronic knee pain      Closed fracture     multiple pubic rami, left initial encounter     Clubbing of fingers      Depression      Diarrhea      Diverticulitis of colon      Dizziness      Dysphagia      Elevated troponin 2015     Fall     struck head on coffee table with no LOC     Fracture     closed fx carpel bone . right wrist      HTN (hypertension)      Hyperglycemia      Hyponatremia      Impaired gait      Joint pain      Light headed      Lumbago      Migraine      NSTEMI (non-ST elevated myocardial infarction) (H)     suspected     Osteoarthrosis      Rhabdomyolysis     traumatic     Scalp contusion     post fall     Symphysis pubis disruption      Type 2 diabetes mellitus (H)      UTI (urinary tract infection)      Weakness      Past Surgical History:   Procedure Laterality Date     BREAST LUMPECTOMY  1955     CATARACT EXTRACTION Left      CHOLECYSTECTOMY  1998     TONSILLECTOMY  1945     Family History   Problem Relation Age of Onset     Hypertension Mother      enlarged heart     Cancer Father      leukemia     Social History     Social History     Marital status:      Spouse name: N/A     Number of children: N/A     Years of education: N/A     Occupational History     Not on file.     Social History Main Topics     Smoking status: Never Smoker     Smokeless tobacco: Never Used     Alcohol use No     Drug use: No     Sexual activity: Not on file     Other Topics Concern     Not on file     Social History Narrative     Allergies   Allergen Reactions     Atorvastatin      Codeine      Norvasc [Amlodipine]      Zestril [Lisinopril]      Current Outpatient Prescriptions   Medication Sig Dispense Refill     acetaminophen (TYLENOL) 325 MG tablet Take 650 mg by mouth every 4 (four) hours as needed for pain.       aspirin 81 MG EC tablet Take 81 mg by mouth daily.       candesartan (ATACAND) 32 MG tablet Take 32 mg by mouth daily.       cloNIDine (CATAPRES-TTS) 0.1 mg/24 hr Place 1 patch on the skin once a week. Every Sunday       diltiazem (DILACOR XR) 180 MG 24 hr capsule Take 180 mg by mouth daily.       lidocaine (LIDODERM) 5 % Place 1 patch on the skin daily. Remove & Discard patch within 12 hours or as directed by MD       lutein-zeaxanthin 25-5 mg cap Take 1 capsule by mouth daily.       sulindac (CLINORIL) 150 MG tablet  Take 150 mg by mouth 2 (two) times a day.       traMADol (ULTRAM) 50 mg tablet Take 25-50 mg by mouth every 4 (four) hours as needed for pain.       No current facility-administered medications for this visit.        REVIEW OF SYSTEMS:    Currently, no fever, chills, or rigors. Does not have any visual or hearing problems. Denies any chest pain, headaches, palpitations, lightheadedness, dizziness, shortness of breath, or cough. Appetite is good. Denies any GERD symptoms. Denies any difficulty with swallowing, nausea, or vomiting.  Denies any abdominal pain, diarrhea or constipation. Denies any urinary symptoms. No insomnia. No active bleeding. No rash.       PHYSICAL EXAMINATION:  Vitals:    09/26/18 2220   BP: 118/64   Pulse: 74   Resp: 16   Temp: 98.3  F (36.8  C)   SpO2: 98%   Weight: 121 lb 6.4 oz (55.1 kg)       GENERAL: Awake, Alert, oriented x3, not in any form of acute distress, answers questions appropriately, follows simple commands, conversant  HEENT: Head is normocephalic with normal hair distribution. No evidence of trauma. Ears: No acute purulent discharge. Eyes: Conjunctivae pink with no scleral jaundice. Nose: Normal mucosa and septum. NECK: Supple with no cervical or supraclavicular lymphadenopathy. Trachea is midline.   CHEST: No tenderness or deformity, no crepitus  LUNG: Clear to auscultation with good chest expansion. There are no crackles or wheezes, normal AP diameter.  BACK: No kyphosis of the thoracic spine. Symmetric, no curvature, ROM normal, no CVA tenderness, no spinal tenderness   CVS: There is good S1  S2, there are no murmurs, rubs, gallops, or heaves, rhythm is regular,  2+ pulses symmetric in all extremities.  ABDOMEN: Globular and soft, nontender to palpation, non distended, no masses, no organomegaly, good bowel sounds, no rebound or guarding, no peritoneal signs.   EXTREMITIES:  Full range of motion on both upper and decreased ROM in lower extremities, there is no tenderness to  palpation, no pedal edema, no cyanosis or clubbing, no calf tenderness.  Pulses equal in all extremities, normal cap refill, no joint swelling.  SKIN: Warm and dry, no erythema noted.  Skin color, texture, no rashes or lesions.  NEUROLOGICAL: The patient is oriented to person, place and time. Strength and sensation are grossly intact. Face is symmetric.    LABS:      Lab Results   Component Value Date    WBC 13.5 (H) 09/20/2018    HGB 11.1 (L) 09/20/2018    HCT 32.9 (L) 09/20/2018    MCV 92 09/20/2018     09/20/2018     Results for orders placed or performed in visit on 09/20/18   Basic Metabolic Panel   Result Value Ref Range    Sodium 138 136 - 145 mmol/L    Potassium 4.6 3.5 - 5.0 mmol/L    Chloride 106 98 - 107 mmol/L    CO2 26 22 - 31 mmol/L    Anion Gap, Calculation 6 5 - 18 mmol/L    Glucose 176 (H) 70 - 125 mg/dL    Calcium 8.9 8.5 - 10.5 mg/dL    BUN 36 (H) 8 - 28 mg/dL    Creatinine 0.68 0.60 - 1.10 mg/dL    GFR MDRD Af Amer >60 >60 mL/min/1.73m2    GFR MDRD Non Af Amer >60 >60 mL/min/1.73m2       ASSESSMENT/PLAN:    1. Pain management - Schedule Tylenol, start Lidocaine patch, Celebrex, and Calcitonin spray.  Continue prn Tramadol.  Will start Calcium with Vitamin D   2. Closed nondisplaced fracture of pelvis with routine healing - Followed by Ortho and PT/OT   3. Oropharyngeal dysphagia - Followed by ST   4. Primary osteoarthritis involving multiple joints - See above   5. Essential hypertension - Blood pressures are within target range, will continue Atacand, Clonidine, and Diltiazem         Electronically signed by:  Christie Talamantes CNP    35 minutes TT of which 50% was spent in counseling and coordination of care of the above plan.    Time spent in interview and examination of patient, review of available records, and discussion with nursing staff. Continue care plan, efforts at therapy, and monitor nutritional status.

## 2021-06-20 NOTE — PROGRESS NOTES
[facility]    Facility:   [facility] [[id number]]   Code Status: DNR/DNI      CHIEF COMPLAINT/REASON FOR VISIT:  Chief Complaint   Patient presents with     Review Of Multiple Medical Conditions       HISTORY:      HPI: Leela is a 89 y.o. female who fell and was evaluated at the hospital.  She had a scalp laceration.  She had no loss of consciousness.  She struck her head on the coffee table.  She was complaining of pain in her knees, this was evaluated and there was no fracture discovered.  During her hospitalization she had x-ray of her pelvis and fracture was determined.  She was having pain with ambulation.  Other medical conditions include hypertension and osteoarthritis.  It appears from records that she has had a non-STEMI.  She also has had diverticulitis of the colon, history of acute renal failure, cataract with subsequent extraction and lens implant, and dysphagia.    Upon current review of systems, she was not showing interest in food or fluids today.  She also was not taking her medications.  Speech has been following and puréed and nectar thick is the recommendation.  Swallow study was recommended.  When I asked her daughter who was present in the room about the possibility of situational depression, she states that she did have this occur in a situational depression in the past and was on Zoloft at least 50 mg without any problem.  She has been coughing more but not having any fevers or chills nor does she have sore throat nor shortness of breath nor chest pain or palpitations of the heart nor abdominal pain or nausea.  Staff had brought to my attention that another child or 2 had wondered about adequate pain management and whether or not a patch would be indicated.  I talked with the daughter present explaining that at this stage of fracture healing it would not be advisable to be adding a patch such as fentanyl.  It would be the expectation that stronger medications  like that would be tapering at this point.    Past Medical History:   Diagnosis Date     Anxiety      ARF (acute renal failure) (H)      Arthritis      Cataract     cortical senile cataract. left      Chronic knee pain      Closed fracture     multiple pubic rami, left initial encounter     Clubbing of fingers      Depression      Diarrhea      Diverticulitis of colon      Dizziness      Dysphagia      Elevated troponin 06/18/2015     Fall     struck head on coffee table with no LOC     Fracture     closed fx carpel bone 1984. right wrist     HTN (hypertension)      Hyperglycemia      Hyponatremia      Impaired gait      Joint pain      Light headed      Lumbago      Migraine      NSTEMI (non-ST elevated myocardial infarction) (H)     suspected     Osteoarthrosis      Rhabdomyolysis     traumatic     Scalp contusion     post fall     Symphysis pubis disruption      Type 2 diabetes mellitus (H)      UTI (urinary tract infection)      Weakness              Family History   Problem Relation Age of Onset     Hypertension Mother      enlarged heart     Cancer Father      leukemia     Social History     Social History     Marital status:      Spouse name: N/A     Number of children: N/A     Years of education: N/A     Social History Main Topics     Smoking status: Never Smoker     Smokeless tobacco: Never Used     Alcohol use No     Drug use: No     Sexual activity: Not on file     Other Topics Concern     Not on file     Social History Narrative         Review of Systems    .  Vitals:    10/04/18 2026   BP: 133/80   Pulse: 98   Resp: 16   Temp: 97  F (36.1  C)   SpO2: 96%       Physical Exam   Constitutional: No distress.   Eyes: Right eye exhibits no discharge. Left eye exhibits no discharge.   Neck: No JVD present.   Cardiovascular: Normal heart sounds.    Pulmonary/Chest: Breath sounds normal. No respiratory distress.   Abdominal: Bowel sounds are normal. There is no tenderness.   Musculoskeletal: She exhibits  no edema.   I am able to do some internal and external rotation of bilateral hips as well as slight flexion and extension without pain   Neurological: She is alert.   Skin: Skin is warm and dry.   Psychiatric: She has a normal mood and affect.   Nursing note and vitals reviewed.        LABS:   No new laboratory testing    ASSESSMENT:      ICD-10-CM    1. Closed nondisplaced fracture of pelvis with routine healing, unspecified part of pelvis, subsequent encounter S32.9XXD    2. Primary osteoarthritis involving multiple joints M15.0    3. Fall, subsequent encounter W19.XXXD    4. Situational depression F43.21    5. Essential hypertension I10        PLAN:    Zoloft 50 mg 1 daily started.  Swallow study to be done.  I encouraged she and her daughter that my exam today there is less evidence for pain since unable to do more in the way of movement of her hip and leg area than I have in the past.  This is encouraging that the pelvic fracture is gradually improving.    Total 25 minutes of which 80 % was spent counseling and coordination of care of the above plan.    Electronically signed by: Praveen Bolanos MD

## 2021-06-20 NOTE — PROGRESS NOTES
[facility]    Facility:   [facility] [[id number]]   Code Status: DNR/DNI      CHIEF COMPLAINT/REASON FOR VISIT:  Chief Complaint   Patient presents with     Review Of Multiple Medical Conditions       HISTORY:      HPI: Leela is a 89 y.o. female who fell and was evaluated at the hospital.  She had a scalp laceration.  She had no loss of consciousness.  She struck her head on the coffee table.  She was complaining of pain in her knees, this was evaluated and there was no fracture discovered.  During her hospitalization she had x-ray of her pelvis and fracture was determined.  She was having pain with ambulation.  Other medical conditions include hypertension and osteoarthritis.  It appears from records that she has had a non-STEMI.  She also has had diverticulitis of the colon, history of acute renal failure, cataract with subsequent extraction and lens implant, and dysphagia.    Upon current review of systems she is not having fevers or chills.  She does not have sore throat.  She does not have as much cough as before and she is not feeling more short of breath.  She does not have chest pain.  Does not have abdominal pain or nausea.  Her daughter has a question about using Mucinex which she had used in the past when she was brought to her attention had been not noticing as much choking and coughing now nor has the staff and she agrees and so Mucinex will not be started.  Also there was question regarding pain management, yet the staff has noticed that the complaint of pain is variable and her daughter is not in favor of increasing the tramadol either at this point.  She would like to have her mother get the flu shot when it is available.    Past Medical History:   Diagnosis Date     Anxiety      ARF (acute renal failure) (H)      Arthritis      Cataract     cortical senile cataract. left      Chronic knee pain      Closed fracture     multiple pubic rami, left initial encounter      Clubbing of fingers      Depression      Diarrhea      Diverticulitis of colon      Dizziness      Dysphagia      Elevated troponin 06/18/2015     Fall     struck head on coffee table with no LOC     Fracture     closed fx carpel bone 1984. right wrist     HTN (hypertension)      Hyperglycemia      Hyponatremia      Impaired gait      Joint pain      Light headed      Lumbago      Migraine      NSTEMI (non-ST elevated myocardial infarction) (H)     suspected     Osteoarthrosis      Rhabdomyolysis     traumatic     Scalp contusion     post fall     Symphysis pubis disruption      Type 2 diabetes mellitus (H)      UTI (urinary tract infection)      Weakness              Family History   Problem Relation Age of Onset     Hypertension Mother      enlarged heart     Cancer Father      leukemia     Social History     Social History     Marital status:      Spouse name: N/A     Number of children: N/A     Years of education: N/A     Social History Main Topics     Smoking status: Never Smoker     Smokeless tobacco: Never Used     Alcohol use No     Drug use: No     Sexual activity: Not on file     Other Topics Concern     Not on file     Social History Narrative         Review of Systems    .  Vitals:    09/18/18 2218   BP: 105/71   Pulse: 100   Resp: 14   Temp: 98.5  F (36.9  C)   SpO2: 94%       Physical Exam   Constitutional: No distress.   Eyes: Right eye exhibits no discharge. Left eye exhibits no discharge.   Neck: No JVD present.   Cardiovascular: Normal heart sounds.    Pulmonary/Chest: Breath sounds normal. No respiratory distress.   Abdominal: Bowel sounds are normal. There is no tenderness.   Musculoskeletal: She exhibits no edema.   Neurological: She is alert.   Mini cog testing is abnormal with 0 of 3 for word recall and an abnormal clock face.  The numbers were placed in the right position, but the hands on the clock did not correspond to 8:20.   Skin: Skin is warm and dry.   Psychiatric: She has a  normal mood and affect.   Nursing note and vitals reviewed.        LABS:   Hemoglobin 11.1.  WBC 13,500.  Sodium 138.  Potassium 4.6.  Glucose 176.  BUN 36.  Creatinine 0.68.  AST 11.  ALT 14.    ASSESSMENT:      Closed pelvic fracture, subsequent encounter.  Osteoarthritis.  Oral pharyngeal dysphagia.      PLAN:    Continue with current plans.  Reassurance provided that laboratory tests show no side effects from the use of the nonsteroidal anti-inflammatory medicine of Clinoril      Electronically signed by: Praveen Bolanos MD

## 2021-06-21 NOTE — PROGRESS NOTES
[facility]    Facility:   [facility] [[id number]]   Code Status: DNR/DNI      CHIEF COMPLAINT/REASON FOR VISIT:  Chief Complaint   Patient presents with     Problem Visit     Hip fracture, pain management HTN, dysphagia       HISTORY:      HPI: Leela is a 89 y.o. female with Arthritis, Hypertension, and on Aspirin who presented to the emergency department via EMS transport for evaluation of a fall. Per EMS, the patient was at her home where she lives alone when she fell and hit her head on a coffee table. They state that she complains of chronicf knee pain. She also reported some right hip pain. She reported that she did not lose consciousness, she is not diabetic, has no heart issues, back pain, or able to walk around outside. Of note, the patient uses a walker or cane at home. Small scalp laceration. Radiography negative as above. Patient demonstrated inability to get out of bed due to severe bilateral leg pain and potentially some right hip tenderness. Due to this, XR of pelvis and right hip has been ordered which revealed a pelvic fracture. She has a history of severe arthritic knee pain and chronic leg pains but the fall has triggered significant exacerbation. She is unable to ambulate (or get out from bed) at this point due to pain.  She was transferred to TCU for continue rehabilitation.    Today, patient was seen in her room while sitting in her wheelchair.  Daughter was present at this time, planning to meet with staff from another nursing facility that patient may go to. The patient continually expresses how tired she is. She said she didn't sleep very well last night. She denies fever, chills, chest pain, shortness of breath, abdominal pain, constipation, diarrhea. She said she ate breakfast that morning but she couldn't recall what she ate. She denies having any pain at this time.     Per nursing staff, patient had been refusing medications and food. However, another  nursing staff said if she was persistent, she was able to convince the patient to take her medications. When asked about why she hasn't been taking her medications she said she just didn't want to. She did say that when the tylenol pill is crushed, the taste makes her nauseated.    Past Medical History:   Diagnosis Date     Anxiety      ARF (acute renal failure) (H)      Arthritis      Cataract     cortical senile cataract. left      Chronic knee pain      Closed fracture     multiple pubic rami, left initial encounter     Clubbing of fingers      Depression      Diarrhea      Diverticulitis of colon      Dizziness      Dysphagia      Elevated troponin 06/18/2015     Fall     struck head on coffee table with no LOC     Fracture     closed fx carpel bone 1984. right wrist     HTN (hypertension)      Hyperglycemia      Hyponatremia      Impaired gait      Joint pain      Light headed      Lumbago      Migraine      NSTEMI (non-ST elevated myocardial infarction) (H)     suspected     Osteoarthrosis      Rhabdomyolysis     traumatic     Scalp contusion     post fall     Symphysis pubis disruption      Type 2 diabetes mellitus (H)      UTI (urinary tract infection)      Weakness              Past Surgical History:   Procedure Laterality Date     BREAST LUMPECTOMY  1955     CATARACT EXTRACTION Left      CHOLECYSTECTOMY  1998     TONSILLECTOMY  1945       Family History   Problem Relation Age of Onset     Hypertension Mother      enlarged heart     Cancer Father      leukemia     Social History     Social History     Marital status:      Spouse name: N/A     Number of children: N/A     Years of education: N/A     Social History Main Topics     Smoking status: Never Smoker     Smokeless tobacco: Never Used     Alcohol use No     Drug use: No     Sexual activity: Not on file     Other Topics Concern     Not on file     Social History Narrative     Current Outpatient Prescriptions on File Prior to Visit   Medication  Sig Dispense Refill     acetaminophen (TYLENOL) 500 MG tablet Take 1,000 mg by mouth 3 (three) times a day.       aspirin 81 MG EC tablet Take 81 mg by mouth daily.       calcitonin, salmon, (MIACALCIN) 200 unit/actuation nasal spray Apply 1 spray into alternating nostrils daily.       candesartan (ATACAND) 32 MG tablet Take 32 mg by mouth daily.       celecoxib (CELEBREX) 200 MG capsule Take 200 mg by mouth 2 (two) times a day.       cloNIDine (CATAPRES-TTS) 0.1 mg/24 hr Place 1 patch on the skin once a week. Every Monday       diltiazem (DILACOR XR) 180 MG 24 hr capsule Take 180 mg by mouth daily.       lidocaine (LIDODERM) 5 % Place 1 patch on the skin daily. Remove & Discard patch within 12 hours or as directed by MD       lutein-zeaxanthin 25-5 mg cap Take 1 capsule by mouth daily.       sertraline (ZOLOFT) 50 MG tablet Take 50 mg by mouth daily.       traMADol (ULTRAM) 50 mg tablet Take 25 mg by mouth every 4 (four) hours as needed for pain.        No current facility-administered medications on file prior to visit.      Allergies   Allergen Reactions     Atorvastatin      Codeine      Norvasc [Amlodipine]      Zestril [Lisinopril]            Review of Systems   Constitutional: Positive for appetite change and fatigue. Negative for chills, diaphoresis and fever.   Respiratory: Negative for cough, chest tightness and shortness of breath.    Cardiovascular: Negative for chest pain, palpitations and leg swelling.   Gastrointestinal: Negative for abdominal pain, constipation, diarrhea and nausea.   Genitourinary: Negative for difficulty urinating and dysuria.   Musculoskeletal: Negative for arthralgias, back pain and myalgias.   Skin: Negative for pallor, rash and wound.   Neurological: Negative for dizziness, light-headedness, numbness and headaches.   Psychiatric/Behavioral: Positive for sleep disturbance.       .  Vitals:    10/13/18 0844   BP: 122/71   Pulse: (!) 107   Resp: 16   Temp: 98.3  F (36.8  C)    SpO2: 97%       Physical Exam   Constitutional: She is oriented to person, place, and time.   HENT:   Head: Normocephalic.   Right Ear: External ear normal.   Left Ear: External ear normal.   Neck: Normal range of motion. Neck supple. No JVD present.   Cardiovascular: Normal rate, regular rhythm, normal heart sounds and intact distal pulses.  Exam reveals no gallop and no friction rub.    No murmur heard.  Pulmonary/Chest: Effort normal and breath sounds normal. No respiratory distress. She has no wheezes. She has no rales.   Abdominal: Soft. She exhibits no distension. Bowel sounds are decreased. There is no tenderness. There is no guarding.   Musculoskeletal: She exhibits no edema or deformity.   Neurological: She is alert and oriented to person, place, and time.   Skin: Skin is warm, dry and intact. There is pallor.   Psychiatric: Her speech is normal. She is withdrawn. She exhibits a depressed mood.         LABS:     Recent Results (from the past 24 hour(s))   Albumin    Collection Time: 10/16/18  6:55 AM   Result Value Ref Range    Albumin 2.6 (L) 3.5 - 5.0 g/dL   Basic Metabolic Panel    Collection Time: 10/16/18  6:55 AM   Result Value Ref Range    Sodium 139 136 - 145 mmol/L    Potassium 3.8 3.5 - 5.0 mmol/L    Chloride 107 98 - 107 mmol/L    CO2 24 22 - 31 mmol/L    Anion Gap, Calculation 8 5 - 18 mmol/L    Glucose 131 (H) 70 - 125 mg/dL    Calcium 8.7 8.5 - 10.5 mg/dL    BUN 18 8 - 28 mg/dL    Creatinine 0.60 0.60 - 1.10 mg/dL    GFR MDRD Af Amer >60 >60 mL/min/1.73m2    GFR MDRD Non Af Amer >60 >60 mL/min/1.73m2   Prealbumin    Collection Time: 10/16/18  6:55 AM   Result Value Ref Range    Prealbumin 15.8 (L) 19.0 - 38.0 mg/dL               ASSESSMENT/PLAN:      ICD-10-CM    1. Closed nondisplaced fracture of pelvis with routine healing, unspecified part of pelvis, subsequent encounter S32.9XXD Pain has improved. D/c tramadol and continue Calcitonin spray, Tylenol, Lidocaine patch and Celebrex.  Last  day for PT/OT   2. Primary osteoarthritis involving multiple joints M15.0 Stable. Continue tylenol.    3. Essential hypertension I10 BP is within target range. Continue with candesartan, clonidine, and diltiazem.  Will check BMP   4. Dysphagia R13.10 Had swallow study. Diet recommendations for NDD Level 2 (mechanically altered); nectar thick; water protocol (small sips of water allowed between meals after oral cares). Changed tylenol from pill to suspension.   5. Situational depression F43.21 Recently started on Zoloft on 10/4. It is too early for dose adjustment. Will continue to monitor.   6. Malnutrition (H) E46 Because patient has been refusing to eat, there is concern for malnutrition. Ordered albumin and prealbumin, which resulted as 2.6 and 15,8. Albumin is similar to her level on 10/1 and 9/20 which were 2.7. No intervention needed at this time. Encourage food and fluids.  Will check BMP         Em DE LEON, am scribing in the presence of, Christie Talamantes CNP  .    IChristie CNP , personally performed the services described in this documentation, as scribed by Em Oshea in my presence, and it is both accurate and complete.            Electronically signed by: Em Oshea

## 2021-06-21 NOTE — PROGRESS NOTES
[facility]    Facility:   [facility] [[id number]]   Code Status: DNR/DNI      CHIEF COMPLAINT/REASON FOR VISIT:  Chief Complaint   Patient presents with     Review Of Multiple Medical Conditions       HISTORY:      HPI: Leela is a 89 y.o. female who fell and was evaluated at the hospital.  She had a scalp laceration.  She had no loss of consciousness.  She struck her head on the coffee table.  She was complaining of pain in her knees, this was evaluated and there was no fracture discovered.  During her hospitalization she had x-ray of her pelvis and fracture was determined.  She was having pain with ambulation.  Other medical conditions include hypertension and osteoarthritis.  It appears from records that she has had a non-STEMI.  She also has had diverticulitis of the colon, history of acute renal failure, cataract with subsequent extraction and lens implant, and dysphagia.    Upon current review of systems, she is not complaining of any new health problems.  She is not complaining of fevers or chills.  She does have chronic cough but this has not worsened.  She is not short of breath.  She does not have chest pain or palpitations of the heart.  She does not have abdominal pain or nausea.  There is no mention from staff regarding refusal to eat which had been present in recent past so it appears that the sertraline has been helping for situational depression.    Past Medical History:   Diagnosis Date     Anxiety      ARF (acute renal failure) (H)      Arthritis      Cataract     cortical senile cataract. left      Chronic knee pain      Closed fracture     multiple pubic rami, left initial encounter     Clubbing of fingers      Depression      Diarrhea      Diverticulitis of colon      Dizziness      Dysphagia      Elevated troponin 06/18/2015     Fall     struck head on coffee table with no LOC     Fracture     closed fx carpel bone 1984. right wrist     HTN (hypertension)       Hyperglycemia      Hyponatremia      Impaired gait      Joint pain      Light headed      Lumbago      Migraine      NSTEMI (non-ST elevated myocardial infarction) (H)     suspected     Osteoarthrosis      Rhabdomyolysis     traumatic     Scalp contusion     post fall     Symphysis pubis disruption      Type 2 diabetes mellitus (H)      UTI (urinary tract infection)      Weakness              Family History   Problem Relation Age of Onset     Hypertension Mother      enlarged heart     Cancer Father      leukemia     Social History     Social History     Marital status:      Spouse name: N/A     Number of children: N/A     Years of education: N/A     Social History Main Topics     Smoking status: Never Smoker     Smokeless tobacco: Never Used     Alcohol use No     Drug use: No     Sexual activity: Not on file     Other Topics Concern     Not on file     Social History Narrative         Review of Systems    .  Vitals:    10/09/18 2047   BP: 114/64   Pulse: 89   Resp: 24   Temp: 97.7  F (36.5  C)   SpO2: 97%       Physical Exam   Constitutional: No distress.   Eyes: Right eye exhibits no discharge. Left eye exhibits no discharge.   Neck: No JVD present.   Cardiovascular: Normal heart sounds.    Pulmonary/Chest: Breath sounds normal. No respiratory distress.   Abdominal: Soft. Bowel sounds are normal. She exhibits no distension. There is no tenderness.   Musculoskeletal: She exhibits no edema or tenderness.   Neurological: She is alert.   Skin: Skin is warm and dry.   Psychiatric: She has a normal mood and affect.   Nursing note and vitals reviewed.        LABS:   No new laboratory testing for blood work, but the video swallow speech testing showed the oral and pharyngeal phases of swallowing to be normal.  Normal oral more her function and bolus formation.  No nasal regurgitation.  Moderate to extensive premature spillage of bolus over the base of the tongue.  Moderate to extensive significant post swallow was  residue in the valleculae or piriform sinuses.  Suboptimal inversion of the epiglottis.  Repeated episodes of flash penetration with thin liquid consistency.  On one occasion contrast reached the vocal cords.  Minimal cough reflex elicited.  Pooling in the vallecula and piriform sinuses noted.    ASSESSMENT:      ICD-10-CM    1. Closed nondisplaced fracture of pelvis with routine healing, unspecified part of pelvis, subsequent encounter S32.9XXD    2. Fall, subsequent encounter W19.XXXD    3. Primary osteoarthritis involving multiple joints M15.0    4. Oropharyngeal dysphagia R13.12    5. Situational depression F43.21        PLAN:    Continue with current plan of medications and nursing management      Electronically signed by: Praveen Bolanos MD